# Patient Record
Sex: MALE | Race: WHITE | NOT HISPANIC OR LATINO | Employment: OTHER | URBAN - METROPOLITAN AREA
[De-identification: names, ages, dates, MRNs, and addresses within clinical notes are randomized per-mention and may not be internally consistent; named-entity substitution may affect disease eponyms.]

---

## 2023-04-21 LAB
ALANINE AMINOTRANSFERASE (SGPT) (U/L) IN SER/PLAS: 19 U/L (ref 10–52)
ALBUMIN (G/DL) IN SER/PLAS: 3.8 G/DL (ref 3.4–5)
ALKALINE PHOSPHATASE (U/L) IN SER/PLAS: 68 U/L (ref 33–120)
ANION GAP IN SER/PLAS: 9 MMOL/L (ref 10–20)
ASPARTATE AMINOTRANSFERASE (SGOT) (U/L) IN SER/PLAS: 20 U/L (ref 9–39)
BILIRUBIN TOTAL (MG/DL) IN SER/PLAS: 2.3 MG/DL (ref 0–1.2)
CALCIDIOL (25 OH VITAMIN D3) (NG/ML) IN SER/PLAS: 71 NG/ML
CALCIUM (MG/DL) IN SER/PLAS: 9.4 MG/DL (ref 8.6–10.3)
CARBON DIOXIDE, TOTAL (MMOL/L) IN SER/PLAS: 29 MMOL/L (ref 21–32)
CHLORIDE (MMOL/L) IN SER/PLAS: 105 MMOL/L (ref 98–107)
CHOLESTEROL (MG/DL) IN SER/PLAS: 161 MG/DL (ref 0–199)
CHOLESTEROL IN HDL (MG/DL) IN SER/PLAS: 52.7 MG/DL
CHOLESTEROL/HDL RATIO: 3.1
COBALAMIN (VITAMIN B12) (PG/ML) IN SER/PLAS: 450 PG/ML (ref 211–911)
CREATININE (MG/DL) IN SER/PLAS: 0.58 MG/DL (ref 0.5–1.3)
ERYTHROCYTE DISTRIBUTION WIDTH (RATIO) BY AUTOMATED COUNT: 14.6 % (ref 11.5–14.5)
ERYTHROCYTE MEAN CORPUSCULAR HEMOGLOBIN CONCENTRATION (G/DL) BY AUTOMATED: 33.3 G/DL (ref 32–36)
ERYTHROCYTE MEAN CORPUSCULAR VOLUME (FL) BY AUTOMATED COUNT: 99 FL (ref 80–100)
ERYTHROCYTES (10*6/UL) IN BLOOD BY AUTOMATED COUNT: 5.41 X10E12/L (ref 4.5–5.9)
ESTIMATED AVERAGE GLUCOSE FOR HBA1C: 117 MG/DL
GFR MALE: >90 ML/MIN/1.73M2
GLUCOSE (MG/DL) IN SER/PLAS: 89 MG/DL (ref 74–99)
HEMATOCRIT (%) IN BLOOD BY AUTOMATED COUNT: 53.5 % (ref 41–52)
HEMOGLOBIN (G/DL) IN BLOOD: 17.8 G/DL (ref 13.5–17.5)
HEMOGLOBIN A1C/HEMOGLOBIN TOTAL IN BLOOD: 5.7 %
LDL: 93 MG/DL (ref 0–99)
LEUKOCYTES (10*3/UL) IN BLOOD BY AUTOMATED COUNT: 3.3 X10E9/L (ref 4.4–11.3)
PLATELETS (10*3/UL) IN BLOOD AUTOMATED COUNT: 150 X10E9/L (ref 150–450)
POTASSIUM (MMOL/L) IN SER/PLAS: 4.1 MMOL/L (ref 3.5–5.3)
PROTEIN TOTAL: 6.8 G/DL (ref 6.4–8.2)
SODIUM (MMOL/L) IN SER/PLAS: 139 MMOL/L (ref 136–145)
THYROTROPIN (MIU/L) IN SER/PLAS BY DETECTION LIMIT <= 0.05 MIU/L: 0.04 MIU/L (ref 0.44–3.98)
THYROXINE (T4) FREE (NG/DL) IN SER/PLAS: 1.25 NG/DL (ref 0.61–1.12)
TRIGLYCERIDE (MG/DL) IN SER/PLAS: 77 MG/DL (ref 0–149)
UREA NITROGEN (MG/DL) IN SER/PLAS: 10 MG/DL (ref 6–23)
VLDL: 15 MG/DL (ref 0–40)

## 2023-04-24 ENCOUNTER — OFFICE VISIT (OUTPATIENT)
Dept: PRIMARY CARE | Facility: CLINIC | Age: 48
End: 2023-04-24
Payer: MEDICARE

## 2023-04-24 VITALS
BODY MASS INDEX: 28.31 KG/M2 | HEIGHT: 63 IN | RESPIRATION RATE: 15 BRPM | SYSTOLIC BLOOD PRESSURE: 90 MMHG | OXYGEN SATURATION: 97 % | WEIGHT: 159.8 LBS | DIASTOLIC BLOOD PRESSURE: 67 MMHG | TEMPERATURE: 97.2 F | HEART RATE: 56 BPM

## 2023-04-24 DIAGNOSIS — M05.9 RHEUMATOID ARTHRITIS WITH POSITIVE RHEUMATOID FACTOR, INVOLVING UNSPECIFIED SITE (MULTI): ICD-10-CM

## 2023-04-24 DIAGNOSIS — E03.9 HYPOTHYROIDISM, UNSPECIFIED TYPE: ICD-10-CM

## 2023-04-24 DIAGNOSIS — Q90.9 DOWN'S SYNDROME (HHS-HCC): ICD-10-CM

## 2023-04-24 DIAGNOSIS — Z00.00 MEDICARE ANNUAL WELLNESS VISIT, SUBSEQUENT: Primary | ICD-10-CM

## 2023-04-24 DIAGNOSIS — E53.8 VITAMIN B12 DEFICIENCY: ICD-10-CM

## 2023-04-24 DIAGNOSIS — E55.9 VITAMIN D DEFICIENCY: ICD-10-CM

## 2023-04-24 DIAGNOSIS — R63.4 WEIGHT LOSS, UNINTENTIONAL: ICD-10-CM

## 2023-04-24 DIAGNOSIS — I10 BENIGN ESSENTIAL HYPERTENSION: ICD-10-CM

## 2023-04-24 DIAGNOSIS — I27.20 PULMONARY HYPERTENSION (MULTI): ICD-10-CM

## 2023-04-24 DIAGNOSIS — R73.01 IMPAIRED FASTING GLUCOSE: ICD-10-CM

## 2023-04-24 DIAGNOSIS — E78.2 MIXED HYPERLIPIDEMIA: ICD-10-CM

## 2023-04-24 DIAGNOSIS — D69.6 THROMBOCYTOPENIA (CMS-HCC): ICD-10-CM

## 2023-04-24 DIAGNOSIS — M35.9 CONNECTIVE TISSUE DISEASE (MULTI): ICD-10-CM

## 2023-04-24 DIAGNOSIS — D72.819 LEUKOPENIA, UNSPECIFIED TYPE: ICD-10-CM

## 2023-04-24 PROBLEM — R19.00 PELVIC MASS IN MALE: Status: ACTIVE | Noted: 2023-04-24

## 2023-04-24 PROBLEM — R76.8 ANA POSITIVE: Status: ACTIVE | Noted: 2023-04-24

## 2023-04-24 PROBLEM — J30.2 SEASONAL ALLERGIES: Status: ACTIVE | Noted: 2023-04-24

## 2023-04-24 PROBLEM — M53.2X1 ATLANTO-AXIAL INSTABILITY: Status: ACTIVE | Noted: 2023-04-24

## 2023-04-24 PROBLEM — K44.9 DIAPHRAGMATIC HERNIA: Status: ACTIVE | Noted: 2023-04-24

## 2023-04-24 PROBLEM — G47.33 OBSTRUCTIVE SLEEP APNEA: Status: ACTIVE | Noted: 2023-04-24

## 2023-04-24 PROBLEM — M19.90 ARTHRITIS: Status: ACTIVE | Noted: 2023-04-24

## 2023-04-24 PROBLEM — Q21.3: Status: ACTIVE | Noted: 2023-04-24

## 2023-04-24 PROBLEM — M20.10 HALLUX VALGUS WITH BUNIONS: Status: ACTIVE | Noted: 2023-04-24

## 2023-04-24 PROBLEM — F41.8 SITUATIONAL ANXIETY: Status: ACTIVE | Noted: 2023-04-24

## 2023-04-24 PROBLEM — M21.619 HALLUX VALGUS WITH BUNIONS: Status: ACTIVE | Noted: 2023-04-24

## 2023-04-24 PROBLEM — R22.2 PARASPINAL MASS: Status: ACTIVE | Noted: 2023-04-24

## 2023-04-24 PROBLEM — L30.9 ECZEMA OF HAND: Status: ACTIVE | Noted: 2023-04-24

## 2023-04-24 PROBLEM — D17.9 MULTIPLE LIPOMAS: Status: ACTIVE | Noted: 2023-04-24

## 2023-04-24 PROBLEM — E78.5 HYPERLIPIDEMIA: Status: ACTIVE | Noted: 2023-04-24

## 2023-04-24 PROBLEM — K59.09 CHRONIC CONSTIPATION: Status: ACTIVE | Noted: 2023-04-24

## 2023-04-24 PROCEDURE — 3008F BODY MASS INDEX DOCD: CPT | Performed by: FAMILY MEDICINE

## 2023-04-24 PROCEDURE — 3074F SYST BP LT 130 MM HG: CPT | Performed by: FAMILY MEDICINE

## 2023-04-24 PROCEDURE — 1036F TOBACCO NON-USER: CPT | Performed by: FAMILY MEDICINE

## 2023-04-24 PROCEDURE — 99214 OFFICE O/P EST MOD 30 MIN: CPT | Performed by: FAMILY MEDICINE

## 2023-04-24 PROCEDURE — G0439 PPPS, SUBSEQ VISIT: HCPCS | Performed by: FAMILY MEDICINE

## 2023-04-24 PROCEDURE — 3078F DIAST BP <80 MM HG: CPT | Performed by: FAMILY MEDICINE

## 2023-04-24 RX ORDER — NAPROXEN 500 MG/1
1 TABLET ORAL DAILY
COMMUNITY
Start: 2021-04-05

## 2023-04-24 RX ORDER — LEVOTHYROXINE SODIUM 100 UG/1
TABLET ORAL
COMMUNITY
End: 2023-04-24 | Stop reason: DRUGHIGH

## 2023-04-24 RX ORDER — FUROSEMIDE 40 MG/1
40 TABLET ORAL DAILY
COMMUNITY
End: 2023-07-24 | Stop reason: SDUPTHER

## 2023-04-24 RX ORDER — IBUPROFEN 100 MG/5ML
1 SUSPENSION, ORAL (FINAL DOSE FORM) ORAL DAILY
COMMUNITY
Start: 2021-10-14

## 2023-04-24 RX ORDER — POTASSIUM CHLORIDE 750 MG/1
10 TABLET, EXTENDED RELEASE ORAL 2 TIMES DAILY
COMMUNITY
End: 2023-07-24 | Stop reason: SDUPTHER

## 2023-04-24 RX ORDER — POLYETHYLENE GLYCOL 3350 17 G/17G
POWDER, FOR SOLUTION ORAL
COMMUNITY
Start: 2022-08-30

## 2023-04-24 RX ORDER — CHOLECALCIFEROL (VITAMIN D3) 125 MCG
1 CAPSULE ORAL DAILY
COMMUNITY
Start: 2021-04-19 | End: 2024-05-09 | Stop reason: ALTCHOICE

## 2023-04-24 RX ORDER — ISOSORBIDE MONONITRATE 10 MG/1
10 TABLET ORAL DAILY
COMMUNITY
End: 2023-07-24 | Stop reason: SDUPTHER

## 2023-04-24 RX ORDER — LEVOTHYROXINE SODIUM 75 UG/1
75 TABLET ORAL DAILY
Qty: 30 TABLET | Refills: 5 | Status: SHIPPED | OUTPATIENT
Start: 2023-04-24 | End: 2023-07-24 | Stop reason: SDUPTHER

## 2023-04-24 RX ORDER — ATORVASTATIN CALCIUM 20 MG/1
20 TABLET, FILM COATED ORAL NIGHTLY
COMMUNITY
End: 2023-07-24 | Stop reason: SDUPTHER

## 2023-04-24 RX ORDER — HYDROXYCHLOROQUINE SULFATE 200 MG/1
1 TABLET, FILM COATED ORAL 2 TIMES DAILY
COMMUNITY
End: 2023-11-16 | Stop reason: SDUPTHER

## 2023-04-24 ASSESSMENT — ACTIVITIES OF DAILY LIVING (ADL)
MANAGING_FINANCES: TOTAL CARE
DRESSING: INDEPENDENT
BATHING: INDEPENDENT
GROCERY_SHOPPING: NEEDS ASSISTANCE
DOING_HOUSEWORK: INDEPENDENT
TAKING_MEDICATION: NEEDS ASSISTANCE

## 2023-04-24 ASSESSMENT — ENCOUNTER SYMPTOMS
ABDOMINAL PAIN: 0
UNEXPECTED WEIGHT CHANGE: 1
PALPITATIONS: 0
SHORTNESS OF BREATH: 0
DEPRESSION: 0
ARTHRALGIAS: 0
OCCASIONAL FEELINGS OF UNSTEADINESS: 0
LOSS OF SENSATION IN FEET: 0
CHILLS: 0
CONFUSION: 0
FEVER: 0
CHEST TIGHTNESS: 0

## 2023-04-24 ASSESSMENT — PATIENT HEALTH QUESTIONNAIRE - PHQ9
SUM OF ALL RESPONSES TO PHQ9 QUESTIONS 1 AND 2: 0
2. FEELING DOWN, DEPRESSED OR HOPELESS: NOT AT ALL
1. LITTLE INTEREST OR PLEASURE IN DOING THINGS: NOT AT ALL

## 2023-04-24 NOTE — ASSESSMENT & PLAN NOTE
Patient continues to lose weight.  Father points out that patient does not seem to be eating as much as he used to.  Things seem to start when he had to have dental work done and get dentures.  Patient denies abdominal pain or changes in bowel habits.  Refer to gastroenterology for further evaluation.  We discussed the potential for an underlying malignancy which lead to his death.  Dad verbalizes understanding.  Previous tests have been reviewed.

## 2023-04-24 NOTE — ASSESSMENT & PLAN NOTE
TSH decreased T4 slightly increased we discussed that thyroid being little over active/supplemented may be contributing to weight loss.  But not necessarily the whole explanation.  We will decrease levothyroxine to 75 mcg daily

## 2023-04-24 NOTE — ASSESSMENT & PLAN NOTE
Discussed with Dr Pool, patient will need to have a re-attempt at [facility] with anesthesia for IR venogram with RF wire.     --Orders placed for [facility]    Jeanne Sanchez PA-C  [phone]  Interventional Radiology   Staffed case with Dr. Pool   Stable continue to monitor

## 2023-04-24 NOTE — PROGRESS NOTES
"Subjective   Reason for Visit: Navin Johnston is an 47 y.o. male here for a Medicare Wellness visit.     Past Medical, Surgical, and Family History reviewed and updated in chart.    Reviewed all medications by prescribing practitioner or clinical pharmacist (such as prescriptions, OTCs, herbal therapies and supplements) and documented in the medical record.    HPI patient today with dad for follow-up of ongoing healthcare issues.  For the most part patient is doing okay except he continues to lose weight.  Dad points out that he is not eating the way he used to.  Eats less amounts of food.  Says he gets full a little earlier than usual.  Reviewing his case dad points out that a lot of this started around the time the patient was having extensive dental work done had to have teeth pulled ended up with dentures.  Since then they have had to prepare his food differently for him to be able to consume it.  Patient denies any abdominal pain or changes in bowel habits.    Patient Care Team:  Fredy Wilson MD as PCP - General  Fredy Wilson MD as PCP - [facility] Attributed Provider     Review of Systems   Constitutional:  Positive for unexpected weight change. Negative for chills and fever.   HENT:  Negative for congestion and ear pain.    Eyes:  Negative for visual disturbance.   Respiratory:  Negative for chest tightness and shortness of breath.    Cardiovascular:  Negative for chest pain and palpitations.   Gastrointestinal:  Negative for abdominal pain.   Musculoskeletal:  Negative for arthralgias.   Skin:  Negative for pallor.   Neurological:         Underlying Down syndrome   Psychiatric/Behavioral:  Negative for confusion.        Objective   Vitals:  BP 90/67   Pulse 56   Temp 36.2 °C (97.2 °F)   Resp 15   Ht 1.6 m (5' 3\")   Wt 72.5 kg (159 lb 12.8 oz)   SpO2 97%   BMI 28.31 kg/m²       Physical Exam  Vitals and nursing note reviewed.   Constitutional:       General: He is not in acute distress.     " Appearance: Normal appearance. He is not ill-appearing.      Comments: Unintentional weight loss   HENT:      Head: Normocephalic and atraumatic.      Right Ear: Tympanic membrane, ear canal and external ear normal.      Left Ear: Tympanic membrane, ear canal and external ear normal.      Mouth/Throat:      Pharynx: Oropharynx is clear.   Eyes:      Extraocular Movements: Extraocular movements intact.   Cardiovascular:      Rate and Rhythm: Normal rate and regular rhythm.      Pulses: Normal pulses.      Heart sounds: Normal heart sounds.   Pulmonary:      Effort: Pulmonary effort is normal.      Breath sounds: Normal breath sounds.   Abdominal:      General: Abdomen is flat. Bowel sounds are normal.      Palpations: Abdomen is soft.      Tenderness: There is no abdominal tenderness.   Musculoskeletal:         General: Normal range of motion.      Cervical back: Neck supple.   Skin:     General: Skin is warm.   Neurological:      Mental Status: He is alert and oriented to person, place, and time. Mental status is at baseline.      Comments: Underlying Down syndrome   Psychiatric:         Mood and Affect: Mood normal.       Recent Results (from the past 1008 hour(s))   TSH with reflex to Free T4 if abnormal    Collection Time: 04/21/23  9:30 AM   Result Value Ref Range    TSH 0.04 (L) 0.44 - 3.98 mIU/L   Vitamin B12    Collection Time: 04/21/23  9:30 AM   Result Value Ref Range    Vitamin B-12 450 211 - 911 pg/mL   Hemoglobin A1C    Collection Time: 04/21/23  9:30 AM   Result Value Ref Range    Hemoglobin A1C 5.7 (A) %    Estimated Average Glucose 117 MG/DL   Comprehensive Metabolic Panel    Collection Time: 04/21/23  9:30 AM   Result Value Ref Range    Glucose 89 74 - 99 mg/dL    Sodium 139 136 - 145 mmol/L    Potassium 4.1 3.5 - 5.3 mmol/L    Chloride 105 98 - 107 mmol/L    Bicarbonate 29 21 - 32 mmol/L    Anion Gap 9 (L) 10 - 20 mmol/L    Urea Nitrogen 10 6 - 23 mg/dL    Creatinine 0.58 0.50 - 1.30 mg/dL    GFR  MALE >90 >90 mL/min/1.73m2    Calcium 9.4 8.6 - 10.3 mg/dL    Albumin 3.8 3.4 - 5.0 g/dL    Alkaline Phosphatase 68 33 - 120 U/L    Total Protein 6.8 6.4 - 8.2 g/dL    AST 20 9 - 39 U/L    Total Bilirubin 2.3 (H) 0.0 - 1.2 mg/dL    ALT (SGPT) 19 10 - 52 U/L   Vitamin D, Total    Collection Time: 04/21/23  9:30 AM   Result Value Ref Range    Vitamin D, 25-Hydroxy 71 ng/mL   Lipid Panel    Collection Time: 04/21/23  9:30 AM   Result Value Ref Range    Cholesterol 161 0 - 199 mg/dL    HDL 52.7 mg/dL    Cholesterol/HDL Ratio 3.1     LDL 93 0 - 99 mg/dL    VLDL 15 0 - 40 mg/dL    Triglycerides 77 0 - 149 mg/dL   CBC    Collection Time: 04/21/23  9:30 AM   Result Value Ref Range    WBC 3.3 (L) 4.4 - 11.3 x10E9/L    RBC 5.41 4.50 - 5.90 x10E12/L    Hemoglobin 17.8 (H) 13.5 - 17.5 g/dL    Hematocrit 53.5 (H) 41.0 - 52.0 %    MCV 99 80 - 100 fL    MCHC 33.3 32.0 - 36.0 g/dL    Platelets 150 150 - 450 x10E9/L    RDW 14.6 (H) 11.5 - 14.5 %   Thyroxine, Free    Collection Time: 04/21/23  9:30 AM   Result Value Ref Range    Free T4 1.25 (H) 0.61 - 1.12 ng/dL     Patient referred to gastroenterology for formal GI evaluation given his unintentional weight loss.  Thyroid medication adjusted  Discussed with the father the potential for underlying malignancy he verbalizes understanding.  We discussed hematology oncology referral at this point dad's elected to hold off on that we will await GI input first.  Return to office in 12 weeks with fasting lab work and reassessment of his case call if any questions or concerns in the meantime.    Assessment/Plan   Problem List Items Addressed This Visit       Benign essential hypertension    Current Assessment & Plan     Stable continue current medication         Relevant Orders    Follow Up In Primary Care    Down's syndrome    Current Assessment & Plan     Clinically stable         Hyperlipidemia    Current Assessment & Plan     Nice improvement in lipids continue to monitor and continue  Lipitor 20 mg daily         Relevant Orders    Comprehensive Metabolic Panel    Lipid Panel    Follow Up In Primary Care    Hypothyroidism    Current Assessment & Plan     TSH decreased T4 slightly increased we discussed that thyroid being little over active/supplemented may be contributing to weight loss.  But not necessarily the whole explanation.  We will decrease levothyroxine to 75 mcg daily         Relevant Medications    levothyroxine (Synthroid, Levoxyl) 75 mcg tablet    Other Relevant Orders    TSH with reflex to Free T4 if abnormal    Follow Up In Primary Care    Connective tissue disease (CMS/[facility])    Current Assessment & Plan     Continue to follow with rheumatology         Leukopenia    Current Assessment & Plan     Mild and stable continue to monitor we discussed heme-onc referral dad wants to hold off         Relevant Orders    CBC    Follow Up In Primary Care    Impaired fasting glucose    Current Assessment & Plan     A1c improved down to 5.7% continue dietary modifications         Relevant Orders    Comprehensive Metabolic Panel    Hemoglobin A1C    Rheumatoid arthritis with rheumatoid factor (CMS/[facility])    Current Assessment & Plan     Stable continue to follow with rheumatology         Pulmonary hypertension (CMS/[facility])    Current Assessment & Plan     Clinically stable continue current treatment follow-up with cardiology         Thrombocytopenia (CMS/[facility])    Current Assessment & Plan     Improved continue to monitor         Relevant Orders    CBC    Vitamin B12 deficiency    Current Assessment & Plan     Stable continue to monitor         Relevant Orders    Vitamin B12    Vitamin D deficiency    Current Assessment & Plan     Stable continue to monitor         Relevant Orders    Vitamin D 1,25 Dihydroxy    Medicare annual wellness visit, subsequent - Primary    Current Assessment & Plan     Labs reviewed.   GI referral placed.         Weight loss, unintentional    Current Assessment & Plan     Patient  continues to lose weight.  Father points out that patient does not seem to be eating as much as he used to.  Things seem to start when he had to have dental work done and get dentures.  Patient denies abdominal pain or changes in bowel habits.  Refer to gastroenterology for further evaluation.  We discussed the potential for an underlying malignancy which lead to his death.  Dad verbalizes understanding.  Previous tests have been reviewed.         Relevant Orders    Referral to Gastroenterology    Follow Up In Primary Care

## 2023-05-16 DIAGNOSIS — E03.9 HYPOTHYROIDISM, UNSPECIFIED TYPE: ICD-10-CM

## 2023-05-16 RX ORDER — LEVOTHYROXINE SODIUM 75 UG/1
TABLET ORAL
Qty: 30 TABLET | Refills: 5 | OUTPATIENT
Start: 2023-05-16

## 2023-07-18 ENCOUNTER — LAB (OUTPATIENT)
Dept: LAB | Facility: LAB | Age: 48
End: 2023-07-18
Payer: MEDICARE

## 2023-07-18 DIAGNOSIS — D69.6 THROMBOCYTOPENIA (CMS-HCC): ICD-10-CM

## 2023-07-18 DIAGNOSIS — E53.8 VITAMIN B12 DEFICIENCY: ICD-10-CM

## 2023-07-18 DIAGNOSIS — E55.9 VITAMIN D DEFICIENCY: ICD-10-CM

## 2023-07-18 DIAGNOSIS — D72.819 LEUKOPENIA, UNSPECIFIED TYPE: ICD-10-CM

## 2023-07-18 DIAGNOSIS — E03.9 HYPOTHYROIDISM, UNSPECIFIED TYPE: ICD-10-CM

## 2023-07-18 DIAGNOSIS — E78.2 MIXED HYPERLIPIDEMIA: ICD-10-CM

## 2023-07-18 DIAGNOSIS — R73.01 IMPAIRED FASTING GLUCOSE: ICD-10-CM

## 2023-07-18 LAB
ALANINE AMINOTRANSFERASE (SGPT) (U/L) IN SER/PLAS: 18 U/L (ref 10–52)
ALBUMIN (G/DL) IN SER/PLAS: 3.6 G/DL (ref 3.4–5)
ALKALINE PHOSPHATASE (U/L) IN SER/PLAS: 77 U/L (ref 33–120)
ANION GAP IN SER/PLAS: 10 MMOL/L (ref 10–20)
ASPARTATE AMINOTRANSFERASE (SGOT) (U/L) IN SER/PLAS: 18 U/L (ref 9–39)
BILIRUBIN TOTAL (MG/DL) IN SER/PLAS: 1.4 MG/DL (ref 0–1.2)
CALCIUM (MG/DL) IN SER/PLAS: 8.8 MG/DL (ref 8.6–10.3)
CARBON DIOXIDE, TOTAL (MMOL/L) IN SER/PLAS: 30 MMOL/L (ref 21–32)
CHLORIDE (MMOL/L) IN SER/PLAS: 106 MMOL/L (ref 98–107)
CHOLESTEROL (MG/DL) IN SER/PLAS: 163 MG/DL (ref 0–199)
CHOLESTEROL IN HDL (MG/DL) IN SER/PLAS: 45.7 MG/DL
CHOLESTEROL/HDL RATIO: 3.6
COBALAMIN (VITAMIN B12) (PG/ML) IN SER/PLAS: 364 PG/ML (ref 211–911)
CREATININE (MG/DL) IN SER/PLAS: 0.58 MG/DL (ref 0.5–1.3)
ERYTHROCYTE DISTRIBUTION WIDTH (RATIO) BY AUTOMATED COUNT: 13.8 % (ref 11.5–14.5)
ERYTHROCYTE MEAN CORPUSCULAR HEMOGLOBIN CONCENTRATION (G/DL) BY AUTOMATED: 33.5 G/DL (ref 32–36)
ERYTHROCYTE MEAN CORPUSCULAR VOLUME (FL) BY AUTOMATED COUNT: 101 FL (ref 80–100)
ERYTHROCYTES (10*6/UL) IN BLOOD BY AUTOMATED COUNT: 4.83 X10E12/L (ref 4.5–5.9)
ESTIMATED AVERAGE GLUCOSE FOR HBA1C: 111 MG/DL
GFR MALE: >90 ML/MIN/1.73M2
GLUCOSE (MG/DL) IN SER/PLAS: 88 MG/DL (ref 74–99)
HEMATOCRIT (%) IN BLOOD BY AUTOMATED COUNT: 49 % (ref 41–52)
HEMOGLOBIN (G/DL) IN BLOOD: 16.4 G/DL (ref 13.5–17.5)
HEMOGLOBIN A1C/HEMOGLOBIN TOTAL IN BLOOD: 5.5 %
LDL: 105 MG/DL (ref 0–99)
LEUKOCYTES (10*3/UL) IN BLOOD BY AUTOMATED COUNT: 2.9 X10E9/L (ref 4.4–11.3)
PLATELETS (10*3/UL) IN BLOOD AUTOMATED COUNT: 168 X10E9/L (ref 150–450)
POTASSIUM (MMOL/L) IN SER/PLAS: 4 MMOL/L (ref 3.5–5.3)
PROTEIN TOTAL: 6.4 G/DL (ref 6.4–8.2)
SODIUM (MMOL/L) IN SER/PLAS: 142 MMOL/L (ref 136–145)
THYROTROPIN (MIU/L) IN SER/PLAS BY DETECTION LIMIT <= 0.05 MIU/L: 1.27 MIU/L (ref 0.44–3.98)
TRIGLYCERIDE (MG/DL) IN SER/PLAS: 62 MG/DL (ref 0–149)
UREA NITROGEN (MG/DL) IN SER/PLAS: 12 MG/DL (ref 6–23)
VLDL: 12 MG/DL (ref 0–40)

## 2023-07-18 PROCEDURE — 83036 HEMOGLOBIN GLYCOSYLATED A1C: CPT

## 2023-07-18 PROCEDURE — 82607 VITAMIN B-12: CPT

## 2023-07-18 PROCEDURE — 36415 COLL VENOUS BLD VENIPUNCTURE: CPT

## 2023-07-18 PROCEDURE — 82652 VIT D 1 25-DIHYDROXY: CPT

## 2023-07-18 PROCEDURE — 80053 COMPREHEN METABOLIC PANEL: CPT

## 2023-07-18 PROCEDURE — 84443 ASSAY THYROID STIM HORMONE: CPT

## 2023-07-18 PROCEDURE — 80061 LIPID PANEL: CPT

## 2023-07-18 PROCEDURE — 85027 COMPLETE CBC AUTOMATED: CPT

## 2023-07-20 LAB — VITAMIN D 1,25-DIHYDROXY: 40.4 PG/ML (ref 19.9–79.3)

## 2023-07-24 ENCOUNTER — OFFICE VISIT (OUTPATIENT)
Dept: PRIMARY CARE | Facility: CLINIC | Age: 48
End: 2023-07-24
Payer: MEDICARE

## 2023-07-24 VITALS
BODY MASS INDEX: 27.1 KG/M2 | TEMPERATURE: 96.9 F | WEIGHT: 153 LBS | SYSTOLIC BLOOD PRESSURE: 97 MMHG | HEART RATE: 55 BPM | DIASTOLIC BLOOD PRESSURE: 60 MMHG | OXYGEN SATURATION: 98 % | RESPIRATION RATE: 14 BRPM

## 2023-07-24 DIAGNOSIS — E53.8 VITAMIN B12 DEFICIENCY: ICD-10-CM

## 2023-07-24 DIAGNOSIS — E55.9 VITAMIN D DEFICIENCY: ICD-10-CM

## 2023-07-24 DIAGNOSIS — D72.819 LEUKOPENIA, UNSPECIFIED TYPE: ICD-10-CM

## 2023-07-24 DIAGNOSIS — I10 BENIGN ESSENTIAL HYPERTENSION: ICD-10-CM

## 2023-07-24 DIAGNOSIS — E78.2 MIXED HYPERLIPIDEMIA: ICD-10-CM

## 2023-07-24 DIAGNOSIS — R63.4 WEIGHT LOSS, UNINTENTIONAL: ICD-10-CM

## 2023-07-24 DIAGNOSIS — E03.9 HYPOTHYROIDISM, UNSPECIFIED TYPE: ICD-10-CM

## 2023-07-24 DIAGNOSIS — R73.01 IMPAIRED FASTING GLUCOSE: ICD-10-CM

## 2023-07-24 DIAGNOSIS — D69.6 THROMBOCYTOPENIA (CMS-HCC): Primary | ICD-10-CM

## 2023-07-24 DIAGNOSIS — M05.9 RHEUMATOID ARTHRITIS WITH POSITIVE RHEUMATOID FACTOR, INVOLVING UNSPECIFIED SITE (MULTI): ICD-10-CM

## 2023-07-24 PROCEDURE — 1036F TOBACCO NON-USER: CPT | Performed by: FAMILY MEDICINE

## 2023-07-24 PROCEDURE — 3078F DIAST BP <80 MM HG: CPT | Performed by: FAMILY MEDICINE

## 2023-07-24 PROCEDURE — 99214 OFFICE O/P EST MOD 30 MIN: CPT | Performed by: FAMILY MEDICINE

## 2023-07-24 PROCEDURE — 3008F BODY MASS INDEX DOCD: CPT | Performed by: FAMILY MEDICINE

## 2023-07-24 PROCEDURE — 3074F SYST BP LT 130 MM HG: CPT | Performed by: FAMILY MEDICINE

## 2023-07-24 RX ORDER — POTASSIUM CHLORIDE 750 MG/1
10 TABLET, FILM COATED, EXTENDED RELEASE ORAL 2 TIMES DAILY
Qty: 180 TABLET | Refills: 3 | Status: SHIPPED | OUTPATIENT
Start: 2023-07-24 | End: 2024-05-09 | Stop reason: ALTCHOICE

## 2023-07-24 RX ORDER — FUROSEMIDE 40 MG/1
40 TABLET ORAL DAILY
Qty: 90 TABLET | Refills: 3 | Status: SHIPPED | OUTPATIENT
Start: 2023-07-24 | End: 2024-05-09 | Stop reason: SDUPTHER

## 2023-07-24 RX ORDER — ISOSORBIDE MONONITRATE 10 MG/1
10 TABLET ORAL DAILY
Qty: 90 TABLET | Refills: 3 | Status: SHIPPED | OUTPATIENT
Start: 2023-07-24 | End: 2024-05-09 | Stop reason: SDUPTHER

## 2023-07-24 RX ORDER — LEVOTHYROXINE SODIUM 75 UG/1
75 TABLET ORAL DAILY
Qty: 90 TABLET | Refills: 3 | Status: SHIPPED | OUTPATIENT
Start: 2023-07-24 | End: 2023-10-03 | Stop reason: SDUPTHER

## 2023-07-24 RX ORDER — ATORVASTATIN CALCIUM 20 MG/1
20 TABLET, FILM COATED ORAL NIGHTLY
Qty: 90 TABLET | Refills: 3 | Status: SHIPPED | OUTPATIENT
Start: 2023-07-24 | End: 2024-07-23

## 2023-07-24 ASSESSMENT — ENCOUNTER SYMPTOMS
CHEST TIGHTNESS: 0
ABDOMINAL PAIN: 0
UNEXPECTED WEIGHT CHANGE: 1
CONFUSION: 0
ARTHRALGIAS: 0
FEVER: 0
CHILLS: 0
SHORTNESS OF BREATH: 0
PALPITATIONS: 0

## 2023-07-24 NOTE — ASSESSMENT & PLAN NOTE
Patient has lost 6 pounds since the spring.  Downward trend continues.  Referral to new gastroenterologist for GI evaluation per family's request.  They were not comfortable with last GI specialist they had seen.  Also referred to hematology for second opinion with regards to unexplained weight loss and leukopenia.  Maintain healthy diet.  Patient/family report appetite is good they have not noticed any decrease in his food and liquid consumption.

## 2023-07-24 NOTE — ASSESSMENT & PLAN NOTE
Referral to gastroenterology for second opinion.  They were not satisfied with the previous GI specialist they had seen.  Also refer to hematology for second opinion with regards to unintentional weight loss and leukopenia.  Maintain healthy diet family reports that appetite for patient appears to be normal have not noticed any decline in food or liquid consumption.

## 2023-07-24 NOTE — PROGRESS NOTES
Subjective   Patient ID: Navin Johnston is a 48 y.o. male who presents for Follow-up (3 month).    HPI today with parents for follow-up of ongoing healthcare issues.  They says they met with Dr. Montana from gastroenterology for GI evaluation with regards to unintentional weight loss.  However they were not comfortable with his approach and would like to get a second opinion from another GI specialist.  Patient has undergone left foot surgery for bunion currently is recovering well and is in a boot and ambulating with the assistance of a cane.  They need paperwork completed for him to attend adult .  They state his appetite appears to be good and they have not noticed any significant decline in food or liquid consumption.    Review of Systems   Constitutional:  Positive for unexpected weight change. Negative for chills and fever.   HENT:  Negative for congestion and ear pain.    Eyes:  Negative for visual disturbance.   Respiratory:  Negative for chest tightness and shortness of breath.    Cardiovascular:  Negative for chest pain and palpitations.   Gastrointestinal:  Negative for abdominal pain.   Musculoskeletal:  Negative for arthralgias.   Skin:  Negative for pallor.   Psychiatric/Behavioral:  Negative for confusion.        Objective   BP 97/60   Pulse 55   Temp 36.1 °C (96.9 °F)   Resp 14   Wt 69.4 kg (153 lb)   SpO2 98%   BMI 27.10 kg/m²     Physical Exam  Vitals and nursing note reviewed.   Constitutional:       General: He is not in acute distress.     Appearance: Normal appearance. He is not ill-appearing.   HENT:      Head: Normocephalic and atraumatic.      Right Ear: Tympanic membrane, ear canal and external ear normal.      Left Ear: Tympanic membrane, ear canal and external ear normal.      Mouth/Throat:      Pharynx: Oropharynx is clear.   Eyes:      Extraocular Movements: Extraocular movements intact.   Cardiovascular:      Rate and Rhythm: Normal rate and regular rhythm.      Pulses:  Normal pulses.      Heart sounds: Normal heart sounds.   Pulmonary:      Effort: Pulmonary effort is normal.      Breath sounds: Normal breath sounds.   Abdominal:      General: Abdomen is flat. Bowel sounds are normal.      Palpations: Abdomen is soft.      Tenderness: There is no abdominal tenderness.   Musculoskeletal:         General: Normal range of motion.      Cervical back: Neck supple.      Comments: Orthopedic boot left lower extremity.  Ambulating with assistance of cane status post left foot surgery   Skin:     General: Skin is warm.   Neurological:      Mental Status: He is alert and oriented to person, place, and time. Mental status is at baseline.      Comments: Underlying Down syndrome   Psychiatric:         Mood and Affect: Mood normal.       Recent Results (from the past 1008 hour(s))   CBC    Collection Time: 07/18/23  9:30 AM   Result Value Ref Range    WBC 2.9 (L) 4.4 - 11.3 x10E9/L    RBC 4.83 4.50 - 5.90 x10E12/L    Hemoglobin 16.4 13.5 - 17.5 g/dL    Hematocrit 49.0 41.0 - 52.0 %     (H) 80 - 100 fL    MCHC 33.5 32.0 - 36.0 g/dL    Platelets 168 150 - 450 x10E9/L    RDW 13.8 11.5 - 14.5 %   Comprehensive Metabolic Panel    Collection Time: 07/18/23  9:30 AM   Result Value Ref Range    Glucose 88 74 - 99 mg/dL    Sodium 142 136 - 145 mmol/L    Potassium 4.0 3.5 - 5.3 mmol/L    Chloride 106 98 - 107 mmol/L    Bicarbonate 30 21 - 32 mmol/L    Anion Gap 10 10 - 20 mmol/L    Urea Nitrogen 12 6 - 23 mg/dL    Creatinine 0.58 0.50 - 1.30 mg/dL    GFR MALE >90 >90 mL/min/1.73m2    Calcium 8.8 8.6 - 10.3 mg/dL    Albumin 3.6 3.4 - 5.0 g/dL    Alkaline Phosphatase 77 33 - 120 U/L    Total Protein 6.4 6.4 - 8.2 g/dL    AST 18 9 - 39 U/L    Total Bilirubin 1.4 (H) 0.0 - 1.2 mg/dL    ALT (SGPT) 18 10 - 52 U/L   Hemoglobin A1C    Collection Time: 07/18/23  9:30 AM   Result Value Ref Range    Hemoglobin A1C 5.5 %    Estimated Average Glucose 111 MG/DL   Lipid Panel    Collection Time: 07/18/23  9:30  AM   Result Value Ref Range    Cholesterol 163 0 - 199 mg/dL    HDL 45.7 mg/dL    Cholesterol/HDL Ratio 3.6      (H) 0 - 99 mg/dL    VLDL 12 0 - 40 mg/dL    Triglycerides 62 0 - 149 mg/dL   TSH with reflex to Free T4 if abnormal    Collection Time: 07/18/23  9:30 AM   Result Value Ref Range    TSH 1.27 0.44 - 3.98 mIU/L   Vitamin D 1,25 Dihydroxy    Collection Time: 07/18/23  9:30 AM   Result Value Ref Range    Vit D, 1,25-Dihydroxy 40.4 19.9 - 79.3 pg/mL   Vitamin B12    Collection Time: 07/18/23  9:30 AM   Result Value Ref Range    Vitamin B-12 364 211 - 911 pg/mL     Recent labs reviewed with patient and family  Taylor to gastroenterology and hematology for second opinions with regards to leukopenia and unintentional weight loss    Continue regular medications    Stressed the importance of keeping appointments with specialist if there is any issues they are to call our office    Return to our office in 8 weeks to reassess his case and review specialty input    Paperwork for adult  completed    Assessment/Plan   Problem List Items Addressed This Visit       Benign essential hypertension     Stable continue current treatment and monitor         Relevant Medications    furosemide (Lasix) 40 mg tablet    isosorbide mononitrate 10 mg tablet    potassium chloride CR 10 mEq ER tablet    Other Relevant Orders    Follow Up In Primary Care - Established    Hyperlipidemia     Stable continue Lipitor 20 mg daily         Relevant Medications    atorvastatin (Lipitor) 20 mg tablet    Hypothyroidism     TSH improved continue levothyroxine 75 mcg daily refill provided         Relevant Medications    levothyroxine (Synthroid, Levoxyl) 75 mcg tablet    Leukopenia     White blood cell count trending downward referral to hematology for second opinion         Relevant Orders    Referral to Hematology    Follow Up In Primary Care - Established    Impaired fasting glucose     A1c down to 5.5% continue dietary  modifications         Rheumatoid arthritis with rheumatoid factor (CMS/HCC)     Continue current medications and follow with rheumatology         Thrombocytopenia (CMS/HCC) - Primary     Stable continue to monitor         Relevant Orders    Referral to Hematology    Vitamin B12 deficiency     Continue to monitor         Vitamin D deficiency     Continue to monitor and supplement as needed         Weight loss, unintentional     Referral to gastroenterology for second opinion.  They were not satisfied with the previous GI specialist they had seen.  Also refer to hematology for second opinion with regards to unintentional weight loss and leukopenia.  Maintain healthy diet family reports that appetite for patient appears to be normal have not noticed any decline in food or liquid consumption.         Relevant Orders    Referral to Gastroenterology    Referral to Hematology    Follow Up In Primary Care - Established

## 2023-08-02 ENCOUNTER — TELEPHONE (OUTPATIENT)
Dept: PRIMARY CARE | Facility: CLINIC | Age: 48
End: 2023-08-02
Payer: MEDICARE

## 2023-08-24 ENCOUNTER — TELEPHONE (OUTPATIENT)
Dept: PRIMARY CARE | Facility: CLINIC | Age: 48
End: 2023-08-24
Payer: MEDICARE

## 2023-08-24 NOTE — TELEPHONE ENCOUNTER
Patients patents calling to see why Navin is on Levothyroxine. Can the dose be lowered with another TSH. He is losing a considerable amount of weight since the initiation of this medication, over the past three years.    Please advise.  Also, have you seen the  results of the CT Scan done on August 3?

## 2023-09-19 ENCOUNTER — APPOINTMENT (OUTPATIENT)
Dept: PRIMARY CARE | Facility: CLINIC | Age: 48
End: 2023-09-19
Payer: MEDICARE

## 2023-10-02 ENCOUNTER — APPOINTMENT (OUTPATIENT)
Dept: RADIOLOGY | Facility: HOSPITAL | Age: 48
End: 2023-10-02
Payer: MEDICARE

## 2023-10-02 ENCOUNTER — HOSPITAL ENCOUNTER (EMERGENCY)
Facility: HOSPITAL | Age: 48
Discharge: HOME | End: 2023-10-02
Attending: STUDENT IN AN ORGANIZED HEALTH CARE EDUCATION/TRAINING PROGRAM | Admitting: EMERGENCY MEDICINE
Payer: MEDICARE

## 2023-10-02 VITALS
TEMPERATURE: 98.6 F | BODY MASS INDEX: 23.56 KG/M2 | OXYGEN SATURATION: 100 % | RESPIRATION RATE: 18 BRPM | SYSTOLIC BLOOD PRESSURE: 122 MMHG | WEIGHT: 150.13 LBS | DIASTOLIC BLOOD PRESSURE: 83 MMHG | HEIGHT: 67 IN | HEART RATE: 70 BPM

## 2023-10-02 DIAGNOSIS — R07.9 ACUTE CHEST PAIN: Primary | ICD-10-CM

## 2023-10-02 LAB
ALBUMIN SERPL BCP-MCNC: 3.5 G/DL (ref 3.4–5)
ALP SERPL-CCNC: 91 U/L (ref 33–120)
ALT SERPL W P-5'-P-CCNC: 20 U/L (ref 10–52)
ANION GAP SERPL CALC-SCNC: 9 MMOL/L (ref 10–20)
AST SERPL W P-5'-P-CCNC: 28 U/L (ref 9–39)
BASOPHILS # BLD AUTO: 0.04 X10*3/UL (ref 0–0.1)
BASOPHILS NFR BLD AUTO: 1.2 %
BILIRUB SERPL-MCNC: 1.3 MG/DL (ref 0–1.2)
BUN SERPL-MCNC: 9 MG/DL (ref 6–23)
CALCIUM SERPL-MCNC: 8.7 MG/DL (ref 8.6–10.3)
CARDIAC TROPONIN I PNL SERPL HS: 5 NG/L (ref 0–20)
CARDIAC TROPONIN I PNL SERPL HS: 6 NG/L (ref 0–20)
CHLORIDE SERPL-SCNC: 104 MMOL/L (ref 98–107)
CO2 SERPL-SCNC: 29 MMOL/L (ref 21–32)
CREAT SERPL-MCNC: 0.65 MG/DL (ref 0.5–1.3)
EOSINOPHIL # BLD AUTO: 0.04 X10*3/UL (ref 0–0.7)
EOSINOPHIL NFR BLD AUTO: 1.2 %
ERYTHROCYTE [DISTWIDTH] IN BLOOD BY AUTOMATED COUNT: 13.8 % (ref 11.5–14.5)
GFR SERPL CREATININE-BSD FRML MDRD: >90 ML/MIN/1.73M*2
GLUCOSE SERPL-MCNC: 104 MG/DL (ref 74–99)
HCT VFR BLD AUTO: 44.4 % (ref 41–52)
HGB BLD-MCNC: 15.4 G/DL (ref 13.5–17.5)
IMM GRANULOCYTES # BLD AUTO: 0.02 X10*3/UL (ref 0–0.7)
IMM GRANULOCYTES NFR BLD AUTO: 0.6 % (ref 0–0.9)
LYMPHOCYTES # BLD AUTO: 1 X10*3/UL (ref 1.2–4.8)
LYMPHOCYTES NFR BLD AUTO: 29.9 %
MAGNESIUM SERPL-MCNC: 1.88 MG/DL (ref 1.6–2.4)
MCH RBC QN AUTO: 34.6 PG (ref 26–34)
MCHC RBC AUTO-ENTMCNC: 34.7 G/DL (ref 32–36)
MCV RBC AUTO: 100 FL (ref 80–100)
MONOCYTES # BLD AUTO: 0.42 X10*3/UL (ref 0.1–1)
MONOCYTES NFR BLD AUTO: 12.5 %
NEUTROPHILS # BLD AUTO: 1.83 X10*3/UL (ref 1.2–7.7)
NEUTROPHILS NFR BLD AUTO: 54.6 %
NRBC BLD-RTO: 0 /100 WBCS (ref 0–0)
PLATELET # BLD AUTO: 135 X10*3/UL (ref 150–450)
PMV BLD AUTO: 10.8 FL (ref 7.5–11.5)
POTASSIUM SERPL-SCNC: 4.2 MMOL/L (ref 3.5–5.3)
PROT SERPL-MCNC: 6.1 G/DL (ref 6.4–8.2)
RBC # BLD AUTO: 4.45 X10*6/UL (ref 4.5–5.9)
SODIUM SERPL-SCNC: 138 MMOL/L (ref 136–145)
WBC # BLD AUTO: 3.4 X10*3/UL (ref 4.4–11.3)

## 2023-10-02 PROCEDURE — 36415 COLL VENOUS BLD VENIPUNCTURE: CPT | Performed by: STUDENT IN AN ORGANIZED HEALTH CARE EDUCATION/TRAINING PROGRAM

## 2023-10-02 PROCEDURE — 84484 ASSAY OF TROPONIN QUANT: CPT | Performed by: STUDENT IN AN ORGANIZED HEALTH CARE EDUCATION/TRAINING PROGRAM

## 2023-10-02 PROCEDURE — 80053 COMPREHEN METABOLIC PANEL: CPT | Performed by: STUDENT IN AN ORGANIZED HEALTH CARE EDUCATION/TRAINING PROGRAM

## 2023-10-02 PROCEDURE — 83735 ASSAY OF MAGNESIUM: CPT | Performed by: STUDENT IN AN ORGANIZED HEALTH CARE EDUCATION/TRAINING PROGRAM

## 2023-10-02 PROCEDURE — 71046 X-RAY EXAM CHEST 2 VIEWS: CPT | Mod: FOREIGN READ | Performed by: RADIOLOGY

## 2023-10-02 PROCEDURE — 2500000001 HC RX 250 WO HCPCS SELF ADMINISTERED DRUGS (ALT 637 FOR MEDICARE OP): Performed by: STUDENT IN AN ORGANIZED HEALTH CARE EDUCATION/TRAINING PROGRAM

## 2023-10-02 PROCEDURE — 99284 EMERGENCY DEPT VISIT MOD MDM: CPT | Performed by: STUDENT IN AN ORGANIZED HEALTH CARE EDUCATION/TRAINING PROGRAM

## 2023-10-02 PROCEDURE — 85025 COMPLETE CBC W/AUTO DIFF WBC: CPT | Performed by: STUDENT IN AN ORGANIZED HEALTH CARE EDUCATION/TRAINING PROGRAM

## 2023-10-02 PROCEDURE — 71046 X-RAY EXAM CHEST 2 VIEWS: CPT | Mod: FY,FR

## 2023-10-02 RX ORDER — NAPROXEN SODIUM 220 MG/1
324 TABLET, FILM COATED ORAL ONCE
Status: COMPLETED | OUTPATIENT
Start: 2023-10-02 | End: 2023-10-02

## 2023-10-02 RX ADMIN — ASPIRIN 81 MG CHEWABLE TABLET 324 MG: 81 TABLET CHEWABLE at 12:09

## 2023-10-02 ASSESSMENT — PAIN SCALES - GENERAL
PAINLEVEL_OUTOF10: 0 - NO PAIN
PAINLEVEL_OUTOF10: 0 - NO PAIN

## 2023-10-02 ASSESSMENT — PAIN - FUNCTIONAL ASSESSMENT: PAIN_FUNCTIONAL_ASSESSMENT: 0-10

## 2023-10-02 ASSESSMENT — LIFESTYLE VARIABLES
HAVE PEOPLE ANNOYED YOU BY CRITICIZING YOUR DRINKING: NO
HAVE YOU EVER FELT YOU SHOULD CUT DOWN ON YOUR DRINKING: NO
EVER FELT BAD OR GUILTY ABOUT YOUR DRINKING: NO
EVER HAD A DRINK FIRST THING IN THE MORNING TO STEADY YOUR NERVES TO GET RID OF A HANGOVER: NO

## 2023-10-02 ASSESSMENT — COLUMBIA-SUICIDE SEVERITY RATING SCALE - C-SSRS
2. HAVE YOU ACTUALLY HAD ANY THOUGHTS OF KILLING YOURSELF?: NO
6. HAVE YOU EVER DONE ANYTHING, STARTED TO DO ANYTHING, OR PREPARED TO DO ANYTHING TO END YOUR LIFE?: NO
1. IN THE PAST MONTH, HAVE YOU WISHED YOU WERE DEAD OR WISHED YOU COULD GO TO SLEEP AND NOT WAKE UP?: NO

## 2023-10-02 NOTE — ED PROVIDER NOTES
HPI   Chief Complaint   Patient presents with   • Chest Pain     No complaints of CP on arrival to ED       48-year-old male, history of Down syndrome with prior tetralogy of Fallot with VSD, resultant pulmonary hypertension, connective tissue disease, diaphragmatic hernia, hypertension, hyperlipidemia, hypothyroidism, and MANNY, presenting to the emergency department today for chest pain.  Chest pain occurred while at work.  Center of his chest, nonradiating, was associated with diaphoresis according to the patient's manager who is with him.  Symptoms spontaneously resolved prior to EMS arrival.  Additional history surrounding the patient's chest pain is unable to be obtained given the patient's baseline mental status. he was brought in for further evaluation.  He is currently pain-free.  Dad and the manager who is with the patient report he has had chest pain like this in the past.        History provided by:  Caregiver, parent and EMS personnel  History limited by: Baseline Down syndrome.   used: No                        No data recorded                Patient History   Past Medical History:   Diagnosis Date   • Obstructive sleep apnea (adult) (pediatric)     MANNY on CPAP   • Other conditions influencing health status 03/16/2018    Pulmonary hypertension, secondary   • Personal history of other diseases of the digestive system     History of fatty infiltration of liver   • Personal history of other diseases of the musculoskeletal system and connective tissue     History of rheumatoid arthritis   • Personal history of other endocrine, nutritional and metabolic disease     History of hypothyroidism   • Personal history of other endocrine, nutritional and metabolic disease     History of obesity   • Syncope and collapse     Syncope and collapse   • Unspecified hearing loss, bilateral     Hearing loss, bilateral     Past Surgical History:   Procedure Laterality Date   • CHOLECYSTECTOMY  03/13/2018     Cholecystectomy   • OTHER SURGICAL HISTORY  03/13/2018    Ventricular Septal Defect Repair     Family History   Problem Relation Name Age of Onset   • Heart disease Mother     • Uterine cancer Mother     • Diabetes type II Mother     • Heart disease Father     • Diabetes type II Father     • Diabetes type II Brother       Social History     Tobacco Use   • Smoking status: Never     Passive exposure: Never   • Smokeless tobacco: Never   Vaping Use   • Vaping Use: Never used   Substance Use Topics   • Alcohol use: Never   • Drug use: Never       Physical Exam   ED Triage Vitals [10/02/23 1058]   Temp Heart Rate Resp BP   37 °C (98.6 °F) 86 18 120/90      SpO2 Temp src Heart Rate Source Patient Position   100 % -- -- --      BP Location FiO2 (%)     -- --       Physical Exam  Constitutional:       General: He is not in acute distress.     Appearance: He is well-developed. He is not toxic-appearing or diaphoretic.   HENT:      Head: Atraumatic.   Eyes:      Extraocular Movements: Extraocular movements intact.      Pupils: Pupils are equal, round, and reactive to light.   Neck:      Vascular: No JVD.   Cardiovascular:      Rate and Rhythm: Normal rate and regular rhythm.      Pulses:           Carotid pulses are 2+ on the right side and 2+ on the left side.       Radial pulses are 2+ on the right side and 2+ on the left side.        Dorsalis pedis pulses are 2+ on the right side and 2+ on the left side.        Posterior tibial pulses are 2+ on the right side and 2+ on the left side.      Heart sounds: Normal heart sounds.   Pulmonary:      Effort: No tachypnea or respiratory distress.      Breath sounds: Normal breath sounds. No stridor.   Abdominal:      Palpations: Abdomen is soft.      Tenderness: There is no abdominal tenderness. There is no guarding or rebound.   Musculoskeletal:      Cervical back: Neck supple.   Lymphadenopathy:      Cervical: No cervical adenopathy.   Skin:     Capillary Refill: Capillary  refill takes less than 2 seconds.   Neurological:      General: No focal deficit present.      Mental Status: He is alert.   Psychiatric:         Mood and Affect: Mood normal.         ED Course & MDM   ED Course as of 10/02/23 1411   Mon Oct 02, 2023   1206 Troponin I Series, High Sensitivity (0, 1 HR)  Initial troponin noted to be normal [NS]   1206 Magnesium  Initial magnesium within normal limits [NS]   1206 Comprehensive Metabolic Panel(!)  Initial CMP with mildly elevated total bili but otherwise unremarkable [NS]   1357 Troponin I Series, High Sensitivity (0, 1 HR)  Repeat troponin noted to be within normal limits [NS]   1358 XR chest 2 views  Chest x-ray noted to be without any acute findings [NS]   1410 CBC and Auto Differential(!)  CBC shows a leukopenia  [NS]      ED Course User Index  [NS] Tono Somers MD         Diagnoses as of 10/02/23 1411   Acute chest pain       Medical Decision Making  Patient presenting to the emergency department today for chest pain.  Differential includes but is not limited to potential ACS, musculoskeletal strain, muscle spasm, costochondritis, esophageal reflux, GERD, esophageal spasm, infectious etiology such as a viral versus bacterial pneumonia, myocarditis, pericarditis to name a few.  EKG blood work and x-ray were obtained patient EKG shows sinus rhythm with ventricular rate of 71, normal axis, normal intervals, no evidence of any acute STEMI or malignant arrhythmia.  See ED course for interpretation of the patient's results.  At this time on reevaluation the patient remains chest pain-free.  Patient has a heart score of 3 and therefore is low risk.  Given he is chest pain-free with normal EKG, troponins, and a low heart score I feel he is stable for discharge with outpatient follow-up to his cardiologist.  I discussed the work-up with the patient's family member and caregiver.  All questions were answered.  Patient was discharged with strict return  precautions    Amount and/or Complexity of Data Reviewed  Independent Historian: guardian and EMS  Labs:  Decision-making details documented in ED Course.  Radiology:  Decision-making details documented in ED Course.  ECG/medicine tests:  Decision-making details documented in ED Course.        Procedure  Procedures     Tono Somers MD  10/02/23 1409       Tono Somers MD  10/02/23 1413

## 2023-10-03 ENCOUNTER — OFFICE VISIT (OUTPATIENT)
Dept: PRIMARY CARE | Facility: CLINIC | Age: 48
End: 2023-10-03
Payer: MEDICARE

## 2023-10-03 VITALS
BODY MASS INDEX: 22.8 KG/M2 | TEMPERATURE: 97.3 F | WEIGHT: 145.6 LBS | DIASTOLIC BLOOD PRESSURE: 68 MMHG | OXYGEN SATURATION: 98 % | HEART RATE: 66 BPM | SYSTOLIC BLOOD PRESSURE: 108 MMHG

## 2023-10-03 DIAGNOSIS — R73.01 IMPAIRED FASTING GLUCOSE: ICD-10-CM

## 2023-10-03 DIAGNOSIS — M35.9 CONNECTIVE TISSUE DISEASE (MULTI): ICD-10-CM

## 2023-10-03 DIAGNOSIS — E55.9 VITAMIN D DEFICIENCY: ICD-10-CM

## 2023-10-03 DIAGNOSIS — R07.89 ATYPICAL CHEST PAIN: ICD-10-CM

## 2023-10-03 DIAGNOSIS — I27.20 PULMONARY HYPERTENSION (MULTI): ICD-10-CM

## 2023-10-03 DIAGNOSIS — E03.9 HYPOTHYROIDISM, UNSPECIFIED TYPE: ICD-10-CM

## 2023-10-03 DIAGNOSIS — E03.8 OTHER SPECIFIED HYPOTHYROIDISM: ICD-10-CM

## 2023-10-03 DIAGNOSIS — E78.2 MIXED HYPERLIPIDEMIA: ICD-10-CM

## 2023-10-03 DIAGNOSIS — R63.4 WEIGHT LOSS, UNINTENTIONAL: Primary | ICD-10-CM

## 2023-10-03 DIAGNOSIS — E53.8 VITAMIN B12 DEFICIENCY: ICD-10-CM

## 2023-10-03 PROBLEM — M70.21 OLECRANON BURSITIS OF RIGHT ELBOW: Status: ACTIVE | Noted: 2023-10-03

## 2023-10-03 PROBLEM — E66.9 MILD OBESITY: Status: ACTIVE | Noted: 2023-10-03

## 2023-10-03 PROBLEM — I10 BENIGN ESSENTIAL HYPERTENSION: Status: ACTIVE | Noted: 2023-10-03

## 2023-10-03 PROBLEM — I10 BENIGN ESSENTIAL HYPERTENSION: Status: RESOLVED | Noted: 2023-04-24 | Resolved: 2023-10-03

## 2023-10-03 PROCEDURE — 1036F TOBACCO NON-USER: CPT | Performed by: FAMILY MEDICINE

## 2023-10-03 PROCEDURE — 99214 OFFICE O/P EST MOD 30 MIN: CPT | Performed by: FAMILY MEDICINE

## 2023-10-03 PROCEDURE — 3008F BODY MASS INDEX DOCD: CPT | Performed by: FAMILY MEDICINE

## 2023-10-03 RX ORDER — LEVOTHYROXINE SODIUM 75 UG/1
TABLET ORAL
Qty: 90 TABLET | Refills: 3
Start: 2023-10-03 | End: 2023-12-29 | Stop reason: SDUPTHER

## 2023-10-03 ASSESSMENT — ENCOUNTER SYMPTOMS
RESPIRATORY NEGATIVE: 1
UNEXPECTED WEIGHT CHANGE: 1
CARDIOVASCULAR NEGATIVE: 1

## 2023-10-03 NOTE — ASSESSMENT & PLAN NOTE
Continue to monitor continue levothyroxine as prescribed   Left message for patient to return call to clinic.

## 2023-10-03 NOTE — ASSESSMENT & PLAN NOTE
Still with some weight loss  GI work-up essentially unremarkable  Recent hematology work-up essentially unremarkable  Weight loss may be secondary to being edentulous and having to modify his dietary habits.  They are to reevaluate potential dentures with his dentist keep a diary of his weekly weights would anticipate that his weight should start to plateau we will continue to monitor.

## 2023-10-03 NOTE — ASSESSMENT & PLAN NOTE
ER work-up unremarkable.  May have been more situational due to argument.  Clinically patient stable without chest pain complaints.  Parents do not feel need to pursue further evaluation at this time.

## 2023-10-03 NOTE — PROGRESS NOTES
Subjective   Patient ID: Navin Johnston is a 48 y.o. male who presents for Follow-up (8 week follow up.  Went to [facility] yesterday for chest pain. ).    HPI patient today with his parents for follow-up on his unintentional weight loss.  He saw the GI specialist Dr. Zuñiga underwent an EGD they did do a biopsy which came back benign.  And no other acute issues were noted from their standpoint.  Given the patient's underlying Down syndrome and mental faculties they do not feel he will tolerate a prep well enough in order to do a colonoscopy.  Next they saw hematology who ordered some additional testing including another CT scan of abdomen and pelvis which again did not reveal any acute pathology.  They did not really find any significant reason for his unintentional weight loss.  Father and mother states that they were under the impression that they do not feel that there is an underlying malignancy given how well patient is doing otherwise and how good his other lab work results appear.  Unrelated to all this patient did have an episode of chest pain yesterday while at work he went to the ER for evaluation work-up there was unremarkable.  Apparently he had an argument with a fellow worker prior to his complaint of chest pain.  When he got home he went out got the garbage cans and was back to himself has had no further problems since.  Parents think it may had to do more with the argument than anything else.    Review of Systems   Constitutional:  Positive for unexpected weight change.   Respiratory: Negative.     Cardiovascular: Negative.    Neurological:         Underlying Down syndrome       Objective   /68 (BP Location: Left arm, Patient Position: Sitting)   Pulse 66   Temp 36.3 °C (97.3 °F)   Wt 66 kg (145 lb 9.6 oz)   SpO2 98%   BMI 22.80 kg/m²     Physical Exam  Constitutional:       General: He is not in acute distress.     Appearance: He is not toxic-appearing.   Cardiovascular:       Rate and Rhythm: Normal rate and regular rhythm.      Heart sounds: Normal heart sounds.   Pulmonary:      Effort: Pulmonary effort is normal. No respiratory distress.      Breath sounds: Normal breath sounds.   Abdominal:      General: Abdomen is flat. Bowel sounds are normal.      Palpations: Abdomen is soft.      Tenderness: There is no abdominal tenderness.   Neurological:      Mental Status: Mental status is at baseline.     Recent specialty reports the ER reports reviewed with patient and parents.    For now it was decided given that everything patient's been through he did not wish to pursue any additional testing at this time with regards to weight loss.  They are going to have him reevaluated with his dentist for possible denture placement.  Continue to monitor his weight.  He was previously a big  has not been able to do that as well without his teeth.    They are to return to our office in December as they may be taking the patient with them when they go to [place] for a few months.  We will recheck fasting lab work prior to that appointment.    They declined flu shot for patient.    Assessment/Plan   Problem List Items Addressed This Visit             ICD-10-CM    Hyperlipidemia E78.5     Stable continue Lipitor 20 mg daily         Relevant Orders    Comprehensive Metabolic Panel    Lipid Panel    Hypothyroidism E03.9     Continue to monitor continue levothyroxine as prescribed         Relevant Medications    levothyroxine (Synthroid, Levoxyl) 75 mcg tablet    Other Relevant Orders    TSH with reflex to Free T4 if abnormal    Connective tissue disease (CMS/HCC) M35.9     Stable as follows rheumatology         Impaired fasting glucose R73.01     Continue dietary modification check A1c with next lab draw         Relevant Orders    Comprehensive Metabolic Panel    Hemoglobin A1C    Pulmonary hypertension (CMS/HCC) I27.20     Clinically stable         Vitamin B12 deficiency E53.8     Continue to  monitor check level with next lab draw         Relevant Orders    CBC    Vitamin B12    Vitamin D deficiency E55.9     Continue to monitor         Relevant Orders    Vitamin D 25-Hydroxy,Total (for eval of Vitamin D levels)    Weight loss, unintentional - Primary R63.4     Still with some weight loss  GI work-up essentially unremarkable  Recent hematology work-up essentially unremarkable  Weight loss may be secondary to being edentulous and having to modify his dietary habits.  They are to reevaluate potential dentures with his dentist keep a diary of his weekly weights would anticipate that his weight should start to plateau we will continue to monitor.         Atypical chest pain R07.89     ER work-up unremarkable.  May have been more situational due to argument.  Clinically patient stable without chest pain complaints.  Parents do not feel need to pursue further evaluation at this time.

## 2023-10-04 NOTE — PATIENT INSTRUCTIONS
Dr. Brown has ordered a stress test to evaluate for any blockages within the heart arteries.  Continue all current medications as prescribed.   Followup with Dr. Brown after the above test.    If you have any questions or cardiac concerns, please call our office at [phone].

## 2023-10-04 NOTE — PROGRESS NOTES
Counseling:  The patient was counseled regarding diagnostic results, instructions for management, risk factor reductions, prognosis, patient and family education, impressions, risks and benefits of treatment options and importance of compliance with treatment.      Chief Complaint:   The patient presents today for annual followup of HTN, hyperlipidemia and Tetralogy of Fallot with VSD.     History Of Present Illness:    Navin Johnston is a 48 year old male patient who presents today in the company of his father for annual followup of HTN, hyperlipidemia and Tetralogy of Fallot with VSD. His PMH is significant for HTN, connective tissue disease, Down's syndrome, hyperlipidemia, hypothyroidism, MANNY, pulmonary HTN, rheumatoid arthritis, Tetralogy of Fallot with VSD and thrombocytopenia. The patient was evaluated in the ED on 10/02/2023 for a chief complaint of chest pain with negative workup. Per the patient's father, he continues to have intermittent chest pain. He is compliant with his prescribed medications.     Last Recorded Vitals:  Vitals:    10/05/23 1603   BP: 88/58   BP Location: Left arm   Pulse: (!) 49   Weight: 66.7 kg (147 lb)   Height: 1.524 m (5')       Past Surgical History:  He has a past surgical history that includes Other surgical history (03/13/2018) and Cholecystectomy (03/13/2018).      Social History:  He reports that he has never smoked. He has never been exposed to tobacco smoke. He has never used smokeless tobacco. He reports that he does not drink alcohol and does not use drugs.    Family History:  Family History   Problem Relation Name Age of Onset    Heart disease Mother      Uterine cancer Mother      Diabetes type II Mother      Heart disease Father      Diabetes type II Father      Other (cardiovascular disease) Father      Diabetes type II Brother          Allergies:  Patient has no known allergies.    Outpatient Medications:  Current Outpatient Medications   Medication Instructions     ascorbic acid (Vitamin C) 1,000 mg tablet 1 tablet, oral, Daily    atorvastatin (LIPITOR) 20 mg, oral, Nightly    cholecalciferol (Vitamin D-3) 125 MCG (5000 UT) capsule 1 capsule, oral, Daily    furosemide (LASIX) 40 mg, oral, Daily    hydroxychloroquine (Plaquenil) 200 mg tablet 1 tablet, oral, 2 times daily    isosorbide mononitrate 10 mg, oral, Daily    levothyroxine (Synthroid, Levoxyl) 75 mcg tablet Once daily except skip Sundays    naproxen (Naprosyn) 500 mg tablet 1 tablet, oral, Daily    polyethylene glycol (Glycolax) 17 gram/dose powder oral, Daily RT    potassium chloride CR 10 mEq ER tablet 10 mEq, oral, 2 times daily     Review of Systems   Cardiovascular:  Positive for chest pain.   All other systems reviewed and are negative.    Physical Exam:  Constitutional:       Appearance: Healthy appearance. Not in distress.   Neck:      Vascular: No JVR. JVD normal.   Pulmonary:      Effort: Pulmonary effort is normal.      Breath sounds: Normal breath sounds. No wheezing. No rhonchi. No rales.   Chest:      Chest wall: Not tender to palpatation.   Cardiovascular:      PMI at left midclavicular line. Normal rate. Regular rhythm. Normal S1. Normal S2.       Murmurs: There is no murmur.      No gallop.  No click. No rub.   Pulses:     Intact distal pulses.   Edema:     Peripheral edema absent.   Abdominal:      General: Bowel sounds are normal.      Palpations: Abdomen is soft.      Tenderness: There is no abdominal tenderness.   Musculoskeletal: Normal range of motion.         General: No tenderness. Skin:     General: Skin is warm and dry.   Neurological:      General: No focal deficit present.      Mental Status: Alert and oriented to person, place and time.       Last Labs:  CBC -  Lab Results   Component Value Date    WBC 3.4 (L) 10/02/2023    HGB 15.4 10/02/2023    HCT 44.4 10/02/2023     10/02/2023     (L) 10/02/2023       CMP -  Lab Results   Component Value Date    CALCIUM 8.7 10/02/2023     PROT 6.1 (L) 10/02/2023    ALBUMIN 3.5 10/02/2023    AST 28 10/02/2023    ALT 20 10/02/2023    ALKPHOS 91 10/02/2023    BILITOT 1.3 (H) 10/02/2023       LIPID PANEL -   Lab Results   Component Value Date    CHOL 163 07/18/2023    TRIG 62 07/18/2023    HDL 45.7 07/18/2023    CHHDL 3.6 07/18/2023    LDLF 105 (H) 07/18/2023    VLDL 12 07/18/2023       RENAL FUNCTION PANEL -   Lab Results   Component Value Date    GLUCOSE 104 (H) 10/02/2023     10/02/2023    K 4.2 10/02/2023     10/02/2023    CO2 29 10/02/2023    ANIONGAP 9 (L) 10/02/2023    BUN 9 10/02/2023    CREATININE 0.65 10/02/2023    GFRMALE CANCELED 07/25/2023    CALCIUM 8.7 10/02/2023    ALBUMIN 3.5 10/02/2023        Lab Results   Component Value Date    HGBA1C 5.5 07/18/2023       Last Cardiology Tests:  11/10/2022 - TTE  1. Left ventricular systolic function is normal with a 60-65% estimated ejection fraction.  2. Abnormal flow near RVOT, possible conduit, operative report unavailable. No prior echo for comparison.  3. Moderately enlarged right ventricle.  4. There is moderately reduced right ventricular systolic function.  5. Mildly elevated RVSP.  6. Mild aortic valve stenosis.      Assessment/Plan   1) HTN  Stable  On isosorbide 10 mg once daily, furosemide 40 mg once daily  CMP 07/18/2023 stable    2) Hyperlipidemia  On atorvastatin 20 mg once daily  Lipid panel 07/18/2023 with LDL of 105    3) Tetralogy of Fallot with VSD  On furosemide 40 mg once daily  CMP 07/18/2023 stable  ED evaluation 10/02/2023 for a chief complaint of chest pain - negative workup  Patient's father endorses persistent intermittent chest pain  Check Lexiscan Cardiolite Stress Test - patient unable to tolerate treadmill stress s/t Down's Syndrome and unable to follow directions appropriately.     4) Preop clearance (11/2022)  Echo reviewed  Stable  Low risk for planned surgery from cardiac standpoint      Scribe Attestation  By signing my name below, I, Desire  Shelby Burks   attest that this documentation has been prepared under the direction and in the presence of Jameson Brown MD.

## 2023-10-05 ENCOUNTER — OFFICE VISIT (OUTPATIENT)
Dept: CARDIOLOGY | Facility: CLINIC | Age: 48
End: 2023-10-05
Payer: MEDICARE

## 2023-10-05 VITALS
BODY MASS INDEX: 28.86 KG/M2 | WEIGHT: 147 LBS | HEART RATE: 49 BPM | SYSTOLIC BLOOD PRESSURE: 88 MMHG | DIASTOLIC BLOOD PRESSURE: 58 MMHG | HEIGHT: 60 IN

## 2023-10-05 DIAGNOSIS — I10 BENIGN ESSENTIAL HYPERTENSION: ICD-10-CM

## 2023-10-05 DIAGNOSIS — Q21.3: Primary | ICD-10-CM

## 2023-10-05 DIAGNOSIS — I25.119 CHEST PAIN DUE TO CAD (CMS-HCC): ICD-10-CM

## 2023-10-05 PROCEDURE — 3008F BODY MASS INDEX DOCD: CPT | Performed by: INTERNAL MEDICINE

## 2023-10-05 PROCEDURE — 3078F DIAST BP <80 MM HG: CPT | Performed by: INTERNAL MEDICINE

## 2023-10-05 PROCEDURE — 99212 OFFICE O/P EST SF 10 MIN: CPT | Performed by: INTERNAL MEDICINE

## 2023-10-05 PROCEDURE — 3074F SYST BP LT 130 MM HG: CPT | Performed by: INTERNAL MEDICINE

## 2023-10-05 PROCEDURE — 93000 ELECTROCARDIOGRAM COMPLETE: CPT | Performed by: INTERNAL MEDICINE

## 2023-10-05 PROCEDURE — 1036F TOBACCO NON-USER: CPT | Performed by: INTERNAL MEDICINE

## 2023-10-05 ASSESSMENT — ENCOUNTER SYMPTOMS
OCCASIONAL FEELINGS OF UNSTEADINESS: 0
DEPRESSION: 0
LOSS OF SENSATION IN FEET: 0

## 2023-10-05 NOTE — LETTER
October 5, 2023     Fredy Wilson MD  [address]  [facility], [address] [place] [id number]    Patient: Navin Johnston   YOB: 1975   Date of Visit: 10/5/2023       Dear Dr. Fredy Wilson MD:    Thank you for referring Navin Johnston to me for evaluation. Below are my notes for this consultation.  If you have questions, please do not hesitate to call me. I look forward to following your patient along with you.       Sincerely,     Jameson Brown MD      CC: No Recipients  ______________________________________________________________________________________    Counseling:  The patient was counseled regarding diagnostic results, instructions for management, risk factor reductions, prognosis, patient and family education, impressions, risks and benefits of treatment options and importance of compliance with treatment.      Chief Complaint:   The patient presents today for annual followup of HTN, hyperlipidemia and Tetralogy of Fallot with VSD.     History Of Present Illness:    Navin Johnston is a 48 year old male patient who presents today in the company of his father for annual followup of HTN, hyperlipidemia and Tetralogy of Fallot with VSD. His PMH is significant for HTN, connective tissue disease, Down's syndrome, hyperlipidemia, hypothyroidism, MANNY, pulmonary HTN, rheumatoid arthritis, Tetralogy of Fallot with VSD and thrombocytopenia. The patient was evaluated in the ED on 10/02/2023 for a chief complaint of chest pain with negative workup. Per the patient's father, he continues to have intermittent chest pain. He is compliant with his prescribed medications.     Last Recorded Vitals:  Vitals:    10/05/23 1603   BP: 88/58   BP Location: Left arm   Pulse: (!) 49   Weight: 66.7 kg (147 lb)   Height: 1.524 m (5')       Past Surgical History:  He has a past surgical history that includes Other surgical history (03/13/2018) and Cholecystectomy (03/13/2018).      Social  History:  He reports that he has never smoked. He has never been exposed to tobacco smoke. He has never used smokeless tobacco. He reports that he does not drink alcohol and does not use drugs.    Family History:  Family History   Problem Relation Name Age of Onset   • Heart disease Mother     • Uterine cancer Mother     • Diabetes type II Mother     • Heart disease Father     • Diabetes type II Father     • Other (cardiovascular disease) Father     • Diabetes type II Brother          Allergies:  Patient has no known allergies.    Outpatient Medications:  Current Outpatient Medications   Medication Instructions   • ascorbic acid (Vitamin C) 1,000 mg tablet 1 tablet, oral, Daily   • atorvastatin (LIPITOR) 20 mg, oral, Nightly   • cholecalciferol (Vitamin D-3) 125 MCG (5000 UT) capsule 1 capsule, oral, Daily   • furosemide (LASIX) 40 mg, oral, Daily   • hydroxychloroquine (Plaquenil) 200 mg tablet 1 tablet, oral, 2 times daily   • isosorbide mononitrate 10 mg, oral, Daily   • levothyroxine (Synthroid, Levoxyl) 75 mcg tablet Once daily except skip Sundays   • naproxen (Naprosyn) 500 mg tablet 1 tablet, oral, Daily   • polyethylene glycol (Glycolax) 17 gram/dose powder oral, Daily RT   • potassium chloride CR 10 mEq ER tablet 10 mEq, oral, 2 times daily     Review of Systems   Cardiovascular:  Positive for chest pain.   All other systems reviewed and are negative.    Physical Exam:  Constitutional:       Appearance: Healthy appearance. Not in distress.   Neck:      Vascular: No JVR. JVD normal.   Pulmonary:      Effort: Pulmonary effort is normal.      Breath sounds: Normal breath sounds. No wheezing. No rhonchi. No rales.   Chest:      Chest wall: Not tender to palpatation.   Cardiovascular:      PMI at left midclavicular line. Normal rate. Regular rhythm. Normal S1. Normal S2.       Murmurs: There is no murmur.      No gallop.  No click. No rub.   Pulses:     Intact distal pulses.   Edema:     Peripheral edema absent.    Abdominal:      General: Bowel sounds are normal.      Palpations: Abdomen is soft.      Tenderness: There is no abdominal tenderness.   Musculoskeletal: Normal range of motion.         General: No tenderness. Skin:     General: Skin is warm and dry.   Neurological:      General: No focal deficit present.      Mental Status: Alert and oriented to person, place and time.       Last Labs:  CBC -  Lab Results   Component Value Date    WBC 3.4 (L) 10/02/2023    HGB 15.4 10/02/2023    HCT 44.4 10/02/2023     10/02/2023     (L) 10/02/2023       CMP -  Lab Results   Component Value Date    CALCIUM 8.7 10/02/2023    PROT 6.1 (L) 10/02/2023    ALBUMIN 3.5 10/02/2023    AST 28 10/02/2023    ALT 20 10/02/2023    ALKPHOS 91 10/02/2023    BILITOT 1.3 (H) 10/02/2023       LIPID PANEL -   Lab Results   Component Value Date    CHOL 163 07/18/2023    TRIG 62 07/18/2023    HDL 45.7 07/18/2023    CHHDL 3.6 07/18/2023    LDLF 105 (H) 07/18/2023    VLDL 12 07/18/2023       RENAL FUNCTION PANEL -   Lab Results   Component Value Date    GLUCOSE 104 (H) 10/02/2023     10/02/2023    K 4.2 10/02/2023     10/02/2023    CO2 29 10/02/2023    ANIONGAP 9 (L) 10/02/2023    BUN 9 10/02/2023    CREATININE 0.65 10/02/2023    GFRMALE CANCELED 07/25/2023    CALCIUM 8.7 10/02/2023    ALBUMIN 3.5 10/02/2023        Lab Results   Component Value Date    HGBA1C 5.5 07/18/2023       Last Cardiology Tests:  11/10/2022 - TTE  1. Left ventricular systolic function is normal with a 60-65% estimated ejection fraction.  2. Abnormal flow near RVOT, possible conduit, operative report unavailable. No prior echo for comparison.  3. Moderately enlarged right ventricle.  4. There is moderately reduced right ventricular systolic function.  5. Mildly elevated RVSP.  6. Mild aortic valve stenosis.      Assessment/Plan  1) HTN  Stable  On isosorbide 10 mg once daily, furosemide 40 mg once daily  CMP 07/18/2023 stable    2) Hyperlipidemia  On  atorvastatin 20 mg once daily  Lipid panel 07/18/2023 with LDL of 105    3) Tetralogy of Fallot with VSD  On furosemide 40 mg once daily  CMP 07/18/2023 stable  ED evaluation 10/02/2023 for a chief complaint of chest pain - negative workup  Patient's father endorses persistent intermittent chest pain  Check Lexiscan Cardiolite Stress Test - patient unable to tolerate treadmill stress s/t Down's Syndrome and unable to follow directions appropriately.     4) Preop clearance (11/2022)  Echo reviewed  Stable  Low risk for planned surgery from cardiac standpoint      Scribe Attestation  By signing my name below, I, Shelby Rodriguez   attest that this documentation has been prepared under the direction and in the presence of Jameson Brown MD.

## 2023-11-06 ENCOUNTER — HOSPITAL ENCOUNTER (OUTPATIENT)
Dept: RADIOLOGY | Facility: HOSPITAL | Age: 48
End: 2023-11-06
Payer: MEDICARE

## 2023-11-06 ENCOUNTER — HOSPITAL ENCOUNTER (OUTPATIENT)
Dept: CARDIOLOGY | Facility: HOSPITAL | Age: 48
Discharge: HOME | End: 2023-11-06
Payer: MEDICARE

## 2023-11-06 ENCOUNTER — HOSPITAL ENCOUNTER (OUTPATIENT)
Dept: RADIOLOGY | Facility: HOSPITAL | Age: 48
Discharge: HOME | End: 2023-11-06
Payer: MEDICARE

## 2023-11-06 DIAGNOSIS — I10 BENIGN ESSENTIAL HYPERTENSION: ICD-10-CM

## 2023-11-06 DIAGNOSIS — Q21.3: ICD-10-CM

## 2023-11-06 DIAGNOSIS — I25.119 CHEST PAIN DUE TO CAD (CMS-HCC): ICD-10-CM

## 2023-11-07 ENCOUNTER — HOSPITAL ENCOUNTER (OUTPATIENT)
Dept: CARDIOLOGY | Facility: HOSPITAL | Age: 48
Discharge: HOME | End: 2023-11-07
Payer: MEDICARE

## 2023-11-07 ENCOUNTER — APPOINTMENT (OUTPATIENT)
Dept: RADIOLOGY | Facility: HOSPITAL | Age: 48
End: 2023-11-07
Payer: MEDICARE

## 2023-11-07 ENCOUNTER — HOSPITAL ENCOUNTER (OUTPATIENT)
Dept: RADIOLOGY | Facility: HOSPITAL | Age: 48
Discharge: HOME | End: 2023-11-07
Payer: MEDICARE

## 2023-11-07 ENCOUNTER — APPOINTMENT (OUTPATIENT)
Dept: CARDIOLOGY | Facility: HOSPITAL | Age: 48
End: 2023-11-07
Payer: MEDICARE

## 2023-11-07 ENCOUNTER — TELEPHONE (OUTPATIENT)
Dept: CARDIOLOGY | Facility: CLINIC | Age: 48
End: 2023-11-07

## 2023-11-07 DIAGNOSIS — R94.39 ABNORMAL STRESS TEST: Primary | ICD-10-CM

## 2023-11-07 DIAGNOSIS — I10 BENIGN ESSENTIAL HYPERTENSION: ICD-10-CM

## 2023-11-07 PROCEDURE — 93018 CV STRESS TEST I&R ONLY: CPT | Performed by: INTERNAL MEDICINE

## 2023-11-07 PROCEDURE — 93017 CV STRESS TEST TRACING ONLY: CPT

## 2023-11-07 PROCEDURE — 78452 HT MUSCLE IMAGE SPECT MULT: CPT | Performed by: INTERNAL MEDICINE

## 2023-11-07 PROCEDURE — 93016 CV STRESS TEST SUPVJ ONLY: CPT | Performed by: INTERNAL MEDICINE

## 2023-11-07 PROCEDURE — 2500000004 HC RX 250 GENERAL PHARMACY W/ HCPCS (ALT 636 FOR OP/ED): Performed by: INTERNAL MEDICINE

## 2023-11-07 PROCEDURE — 78452 HT MUSCLE IMAGE SPECT MULT: CPT

## 2023-11-07 PROCEDURE — 3430000001 HC RX 343 DIAGNOSTIC RADIOPHARMACEUTICALS: Performed by: INTERNAL MEDICINE

## 2023-11-07 PROCEDURE — A9502 TC99M TETROFOSMIN: HCPCS | Performed by: INTERNAL MEDICINE

## 2023-11-07 RX ORDER — REGADENOSON 0.08 MG/ML
0.4 INJECTION, SOLUTION INTRAVENOUS ONCE
Status: COMPLETED | OUTPATIENT
Start: 2023-11-07 | End: 2023-11-07

## 2023-11-07 RX ADMIN — TETROFOSMIN 30 MILLICURIE: 0.23 INJECTION, POWDER, LYOPHILIZED, FOR SOLUTION INTRAVENOUS at 11:43

## 2023-11-07 RX ADMIN — REGADENOSON 0.4 MG: 0.08 INJECTION, SOLUTION INTRAVENOUS at 11:26

## 2023-11-07 RX ADMIN — TETROFOSMIN 10 MILLICURIE: 0.23 INJECTION, POWDER, LYOPHILIZED, FOR SOLUTION INTRAVENOUS at 11:43

## 2023-11-07 NOTE — TELEPHONE ENCOUNTER
----- Message from Maco Ellsworth RN sent at 11/7/2023  2:09 PM EST -----  Per Dr. Ballesteros, his stress is abnormal. Is a patient of Dr. Ballesteros

## 2023-11-07 NOTE — TELEPHONE ENCOUNTER
Nuclear stress test done today 11/7/23 and Dr. Ballesteros notified Maco BUSTOS that it was abnormal.  This is Dr. Brown's patient    Last seen: 10/5/23 by Dr. Brown  Next appointment with Dr Brown on 11/2023  Will forward to Dr. Brown to review and give plan of care.

## 2023-11-08 NOTE — TELEPHONE ENCOUNTER
I called and spoke with patient's parents and went over stress test result and plan for the CT angio of coronaries recommended by Dr. Brown. Last CMP 10/2/23 so creatinine bloodwork will need to be done. I discussed blood work and placed order. Nuha notified that CT angio was ordered and needs scheduled. Patient will need 11/20/23 appointment moved out after the CT angio completed.

## 2023-11-16 ENCOUNTER — OFFICE VISIT (OUTPATIENT)
Dept: RHEUMATOLOGY | Facility: CLINIC | Age: 48
End: 2023-11-16
Payer: MEDICARE

## 2023-11-16 ENCOUNTER — LAB (OUTPATIENT)
Dept: LAB | Facility: LAB | Age: 48
End: 2023-11-16
Payer: MEDICARE

## 2023-11-16 VITALS — WEIGHT: 148 LBS | BODY MASS INDEX: 28.9 KG/M2

## 2023-11-16 DIAGNOSIS — M05.79 RHEUMATOID ARTHRITIS INVOLVING MULTIPLE SITES WITH POSITIVE RHEUMATOID FACTOR (MULTI): Primary | ICD-10-CM

## 2023-11-16 DIAGNOSIS — M05.79 RHEUMATOID ARTHRITIS INVOLVING MULTIPLE SITES WITH POSITIVE RHEUMATOID FACTOR (MULTI): ICD-10-CM

## 2023-11-16 DIAGNOSIS — R76.8 ANA POSITIVE: ICD-10-CM

## 2023-11-16 DIAGNOSIS — I10 BENIGN ESSENTIAL HYPERTENSION: ICD-10-CM

## 2023-11-16 DIAGNOSIS — R94.39 ABNORMAL STRESS TEST: ICD-10-CM

## 2023-11-16 LAB
C3 SERPL-MCNC: 95 MG/DL (ref 87–200)
C4 SERPL-MCNC: 14 MG/DL (ref 10–50)
CREAT SERPL-MCNC: 0.55 MG/DL (ref 0.5–1.3)
CRP SERPL-MCNC: 0.3 MG/DL
ERYTHROCYTE [SEDIMENTATION RATE] IN BLOOD BY WESTERGREN METHOD: 4 MM/H (ref 0–15)
GFR SERPL CREATININE-BSD FRML MDRD: >90 ML/MIN/1.73M*2

## 2023-11-16 PROCEDURE — 1036F TOBACCO NON-USER: CPT | Performed by: INTERNAL MEDICINE

## 2023-11-16 PROCEDURE — 85652 RBC SED RATE AUTOMATED: CPT

## 2023-11-16 PROCEDURE — 86225 DNA ANTIBODY NATIVE: CPT

## 2023-11-16 PROCEDURE — 86140 C-REACTIVE PROTEIN: CPT

## 2023-11-16 PROCEDURE — 3008F BODY MASS INDEX DOCD: CPT | Performed by: INTERNAL MEDICINE

## 2023-11-16 PROCEDURE — 82565 ASSAY OF CREATININE: CPT

## 2023-11-16 PROCEDURE — 36415 COLL VENOUS BLD VENIPUNCTURE: CPT

## 2023-11-16 PROCEDURE — 99213 OFFICE O/P EST LOW 20 MIN: CPT | Performed by: INTERNAL MEDICINE

## 2023-11-16 PROCEDURE — 86160 COMPLEMENT ANTIGEN: CPT

## 2023-11-16 RX ORDER — HYDROXYCHLOROQUINE SULFATE 200 MG/1
300 TABLET, FILM COATED ORAL DAILY
Qty: 135 TABLET | Refills: 1 | Status: SHIPPED | OUTPATIENT
Start: 2023-11-16 | End: 2024-02-14 | Stop reason: WASHOUT

## 2023-11-16 NOTE — PROGRESS NOTES
"Subjective   Patient ID: Navin Johnston is a 48 y.o. male who presents for Follow-up and Arthritis.    HPI.  48-year-old male with history of seropositive RA, positive CALEB, Down syndrome, tetralogy of Fallot, secondary pulmonary hypertension, MANNY and hypothyroidism presented for follow-up.    He has chronic pain and deformity of the right middle finger PIP joints.  He bowels almost every week.  He noted pain relief after corticosteroid injection given at last visit.    He has lost significant weight.  Work-up is negative.  He has chronic leukopenia thought to be due to hydroxychloroquine.    Immunosuppression: Hydroxychloroquine.(6/24/19).    Review of Systems   All other systems reviewed and are negative.    Objective .      10/2/2023    10:58 AM 10/2/2023    12:09 PM 10/2/2023     1:11 PM 10/2/2023     2:05 PM 10/3/2023    10:55 AM 10/5/2023     4:03 PM 11/16/2023    10:31 AM   Vitals   Systolic 120 110 123 122 108 88    Diastolic 90 66 76 83 68 58    Heart Rate 86 60 73 70 66 49    Temp 37 °C (98.6 °F)    36.3 °C (97.3 °F)     Resp 18 16 18 18      Height (in) 1.702 m (5' 7\")     1.524 m (5')    Weight (lb) 150.13    145.6 147 148   BMI 23.51 kg/m2    22.8 kg/m2 28.71 kg/m2 28.9 kg/m2   BSA (m2) 1.79 m2    1.77 m2 1.68 m2 1.69 m2   Visit Report     Report Report Report      Physical Exam.  Gen. AAO x3, NAD.  HEENT: No pallor or icterus, PERRLA, EOMI.  No cervical lymphadenopathy .  Skin: No rashes.  Heart: S1, S2/ RRR.   Lungs: CTA B.  Abdomen: Soft, NT/ND.  MSK: Right middle finger PIP with tenderness and chronic mild flexion deformity.    Neuro:  Sensation to touch intact.Strength 5/5 throughout.   Psych:Appropriate mood and behavior  EXT: No edema    Assessment/Plan .  48-year-old male with history of seropositive RA, positive CALEB, Down syndrome, tetralogy of Fallot, secondary pulmonary hypertension, MANNY and hypothyroidism presented for follow-up.    #1: Seropositive RA.  Overall he is doing well however " chronic right middle finger PIP joint pain and deformity appears secondary to bowling. Discussed with the patient that intra-articular corticosteroid injection may not be beneficial if he continues to boweling.  -Discussed to use Voltaren gel 3 times a day.  -Decrease hydroxychloroquine to 300 mg daily.  Dose adjusted according to current weight.  -Labs.    Follow-up in 6 months.     This note was partially generated using the Dragon Voice recognition system. There may be some incorrect wording, spelling and/or spelling errors or punctuation errors that were not corrected prior to committing the note to the medical record.    Patient Active Problem List   Diagnosis    CALEB positive    Atlanto-axial instability    Arthritis    Chronic constipation    Diaphragmatic hernia    Down's syndrome    Eczema of hand    Hallux valgus with bunions    Hyperlipidemia    Hypothyroidism    Connective tissue disease (CMS/HCC)    Leukopenia    Impaired fasting glucose    Multiple lipomas    Obstructive sleep apnea    Paraspinal mass    Pelvic mass in male    Rheumatoid arthritis with rheumatoid factor (CMS/HCC)    Pulmonary hypertension (CMS/HCC)    Tetralogy of Fallot with VSD    Thrombocytopenia (CMS/HCC)    Situational anxiety    Seasonal allergies    Vitamin B12 deficiency    Vitamin D deficiency    Medicare annual wellness visit, subsequent    Weight loss, unintentional    Atypical chest pain    Olecranon bursitis of right elbow    Mild obesity    Weight loss, abnormal    Benign essential hypertension      Past Surgical History:   Procedure Laterality Date    CHOLECYSTECTOMY  03/13/2018    Cholecystectomy    OTHER SURGICAL HISTORY  03/13/2018    Ventricular Septal Defect Repair      Social History     Tobacco Use    Smoking status: Never     Passive exposure: Never    Smokeless tobacco: Never   Substance Use Topics    Alcohol use: Never      Family History   Problem Relation Name Age of Onset    Heart disease Mother      Uterine  cancer Mother      Diabetes type II Mother      Heart disease Father      Diabetes type II Father      Other (cardiovascular disease) Father      Diabetes type II Brother        No Known Allergies   Current Outpatient Medications   Medication Instructions    ascorbic acid (Vitamin C) 1,000 mg tablet 1 tablet, oral, Daily    atorvastatin (LIPITOR) 20 mg, oral, Nightly    cholecalciferol (Vitamin D-3) 125 MCG (5000 UT) capsule 1 capsule, oral, Daily    furosemide (LASIX) 40 mg, oral, Daily    hydroxychloroquine (PLAQUENIL) 300 mg, oral, Daily    isosorbide mononitrate 10 mg, oral, Daily    levothyroxine (Synthroid, Levoxyl) 75 mcg tablet Once daily except skip Sundays    naproxen (Naprosyn) 500 mg tablet 1 tablet, oral, Daily    polyethylene glycol (Glycolax) 17 gram/dose powder oral, Daily RT    potassium chloride CR 10 mEq ER tablet 10 mEq, oral, 2 times daily

## 2023-11-16 NOTE — PATIENT INSTRUCTIONS
Take hydroxychloroquine 1 and half pill daily.  Use Voltaren gel 3 times a day.  Take Tylenol as needed.  Call me if any question.  Follow-up in 6 months or sooner if needed.

## 2023-11-17 LAB — DSDNA AB SER-ACNC: 1 IU/ML

## 2023-11-20 ENCOUNTER — APPOINTMENT (OUTPATIENT)
Dept: CARDIOLOGY | Facility: CLINIC | Age: 48
End: 2023-11-20
Payer: MEDICARE

## 2023-12-04 ENCOUNTER — HOSPITAL ENCOUNTER (OUTPATIENT)
Dept: RADIOLOGY | Facility: HOSPITAL | Age: 48
Discharge: HOME | End: 2023-12-04
Payer: MEDICARE

## 2023-12-04 VITALS
DIASTOLIC BLOOD PRESSURE: 63 MMHG | BODY MASS INDEX: 28.47 KG/M2 | SYSTOLIC BLOOD PRESSURE: 94 MMHG | HEART RATE: 97 BPM | HEIGHT: 60 IN | OXYGEN SATURATION: 98 % | TEMPERATURE: 98.2 F | WEIGHT: 145 LBS | RESPIRATION RATE: 18 BRPM

## 2023-12-04 DIAGNOSIS — R94.39 ABNORMAL STRESS TEST: ICD-10-CM

## 2023-12-04 PROCEDURE — 75574 CT ANGIO HRT W/3D IMAGE: CPT

## 2023-12-04 PROCEDURE — 75574 CT ANGIO HRT W/3D IMAGE: CPT | Performed by: RADIOLOGY

## 2023-12-04 PROCEDURE — 2500000004 HC RX 250 GENERAL PHARMACY W/ HCPCS (ALT 636 FOR OP/ED): Performed by: NURSE PRACTITIONER

## 2023-12-04 PROCEDURE — 2550000001 HC RX 255 CONTRASTS: Performed by: INTERNAL MEDICINE

## 2023-12-04 PROCEDURE — 2500000005 HC RX 250 GENERAL PHARMACY W/O HCPCS: Performed by: NURSE PRACTITIONER

## 2023-12-04 RX ORDER — NITROGLYCERIN 0.4 MG/1
0.8 TABLET SUBLINGUAL ONCE
Status: DISCONTINUED | OUTPATIENT
Start: 2023-12-04 | End: 2023-12-05 | Stop reason: HOSPADM

## 2023-12-04 RX ORDER — METOPROLOL TARTRATE 1 MG/ML
5 INJECTION, SOLUTION INTRAVENOUS ONCE AS NEEDED
Status: DISCONTINUED | OUTPATIENT
Start: 2023-12-04 | End: 2023-12-05 | Stop reason: HOSPADM

## 2023-12-04 RX ORDER — LORAZEPAM 2 MG/ML
0.5 INJECTION INTRAMUSCULAR ONCE
Status: DISCONTINUED | OUTPATIENT
Start: 2023-12-04 | End: 2023-12-05 | Stop reason: HOSPADM

## 2023-12-04 RX ORDER — METOPROLOL TARTRATE 1 MG/ML
5 INJECTION, SOLUTION INTRAVENOUS ONCE
Status: DISCONTINUED | OUTPATIENT
Start: 2023-12-04 | End: 2023-12-05 | Stop reason: HOSPADM

## 2023-12-04 RX ORDER — METOPROLOL TARTRATE 1 MG/ML
5 INJECTION, SOLUTION INTRAVENOUS ONCE AS NEEDED
Status: COMPLETED | OUTPATIENT
Start: 2023-12-04 | End: 2023-12-04

## 2023-12-04 RX ADMIN — IOHEXOL 95 ML: 350 INJECTION, SOLUTION INTRAVENOUS at 13:02

## 2023-12-04 RX ADMIN — METOPROLOL TARTRATE 5 MG: 1 INJECTION, SOLUTION INTRAVENOUS at 12:23

## 2023-12-04 RX ADMIN — IOHEXOL 85 ML: 350 INJECTION, SOLUTION INTRAVENOUS at 13:34

## 2023-12-04 RX ADMIN — SODIUM CHLORIDE 250 ML: 9 INJECTION, SOLUTION INTRAVENOUS at 12:15

## 2023-12-04 ASSESSMENT — PAIN - FUNCTIONAL ASSESSMENT: PAIN_FUNCTIONAL_ASSESSMENT: 0-10

## 2023-12-04 ASSESSMENT — PAIN SCALES - GENERAL: PAINLEVEL_OUTOF10: 0 - NO PAIN

## 2023-12-05 ENCOUNTER — TELEPHONE (OUTPATIENT)
Dept: CARDIOLOGY | Facility: CLINIC | Age: 48
End: 2023-12-05

## 2023-12-05 ENCOUNTER — OFFICE VISIT (OUTPATIENT)
Dept: CARDIOLOGY | Facility: CLINIC | Age: 48
End: 2023-12-05
Payer: MEDICARE

## 2023-12-05 DIAGNOSIS — Q21.3: ICD-10-CM

## 2023-12-05 DIAGNOSIS — I10 BENIGN ESSENTIAL HYPERTENSION: ICD-10-CM

## 2023-12-05 NOTE — TELEPHONE ENCOUNTER
Patient accompanied to appointment today with his parents. Dr. Brown was pulled away for emergent procedure in the hospital, and they don't want to wait at this time for appointment. They were requesting result of CTA coronary arteries but result is still pending. This was completed yesterday. I let them know I would call when Dr. Brown receives and reviews result and discuss plan of care. Wondering when patient needs follow up next or if follow can be virtual.

## 2023-12-07 NOTE — TELEPHONE ENCOUNTER
CT Result is back now. Patient's parents asking for plan of care. When needs to be seen again and if it could be virtual. [place] leave out of [place] 3[place]4 months during the winter. Please advise. Thanks

## 2023-12-08 NOTE — TELEPHONE ENCOUNTER
I called and spoke with patient's mother and went over test result from Dr. Brown and plan for 1 year follow up. She was happy to hear this. She denies questions.

## 2023-12-19 ENCOUNTER — OFFICE VISIT (OUTPATIENT)
Dept: PRIMARY CARE | Facility: CLINIC | Age: 48
End: 2023-12-19
Payer: MEDICARE

## 2023-12-19 VITALS
RESPIRATION RATE: 14 BRPM | BODY MASS INDEX: 28.51 KG/M2 | TEMPERATURE: 96.6 F | HEART RATE: 58 BPM | OXYGEN SATURATION: 98 % | SYSTOLIC BLOOD PRESSURE: 103 MMHG | WEIGHT: 146 LBS | DIASTOLIC BLOOD PRESSURE: 72 MMHG

## 2023-12-19 DIAGNOSIS — D72.819 LEUKOPENIA, UNSPECIFIED TYPE: ICD-10-CM

## 2023-12-19 DIAGNOSIS — R73.01 IMPAIRED FASTING GLUCOSE: ICD-10-CM

## 2023-12-19 DIAGNOSIS — R63.4 WEIGHT LOSS, UNINTENTIONAL: ICD-10-CM

## 2023-12-19 DIAGNOSIS — R07.89 ATYPICAL CHEST PAIN: ICD-10-CM

## 2023-12-19 DIAGNOSIS — E78.2 MIXED HYPERLIPIDEMIA: ICD-10-CM

## 2023-12-19 DIAGNOSIS — H61.23 BILATERAL IMPACTED CERUMEN: ICD-10-CM

## 2023-12-19 DIAGNOSIS — E53.8 VITAMIN B12 DEFICIENCY: ICD-10-CM

## 2023-12-19 DIAGNOSIS — D69.6 THROMBOCYTOPENIA (CMS-HCC): ICD-10-CM

## 2023-12-19 DIAGNOSIS — I10 BENIGN ESSENTIAL HYPERTENSION: Primary | ICD-10-CM

## 2023-12-19 DIAGNOSIS — E03.8 OTHER SPECIFIED HYPOTHYROIDISM: ICD-10-CM

## 2023-12-19 DIAGNOSIS — E55.9 VITAMIN D DEFICIENCY: ICD-10-CM

## 2023-12-19 PROCEDURE — 3078F DIAST BP <80 MM HG: CPT | Performed by: FAMILY MEDICINE

## 2023-12-19 PROCEDURE — 3008F BODY MASS INDEX DOCD: CPT | Performed by: FAMILY MEDICINE

## 2023-12-19 PROCEDURE — 1036F TOBACCO NON-USER: CPT | Performed by: FAMILY MEDICINE

## 2023-12-19 PROCEDURE — 99214 OFFICE O/P EST MOD 30 MIN: CPT | Performed by: FAMILY MEDICINE

## 2023-12-19 PROCEDURE — 69210 REMOVE IMPACTED EAR WAX UNI: CPT | Performed by: FAMILY MEDICINE

## 2023-12-19 PROCEDURE — 3074F SYST BP LT 130 MM HG: CPT | Performed by: FAMILY MEDICINE

## 2023-12-19 ASSESSMENT — ENCOUNTER SYMPTOMS
CHILLS: 0
ARTHRALGIAS: 0
FEVER: 0
PALPITATIONS: 0
ABDOMINAL PAIN: 0
CONFUSION: 0
SHORTNESS OF BREATH: 0
CHEST TIGHTNESS: 0

## 2023-12-19 NOTE — PROGRESS NOTES
Subjective   Patient ID: Navin Johnston is a 48 y.o. male who presents for Follow-up (4 month).    HPI patient today with parents for follow-up of ongoing healthcare issues.  Did not get any of lab work done but will do so later this week.  They did see cardiology underwent a cardiac stress test which raise the possibility of some reversible ischemia subsequently he was sent for CT angiography test which came back showing no significant coronary artery disease cardiology did not feel any further workup was warranted they did not need to see him for a year.  Symptomatically he is doing well with no complaints of chest pain or shortness of breath.  On a separate note they state that they are hoping he will maintain his current weight give or take a few pounds.  Seems to be eating better since having his dentures refitted.  Also concerned that he has some waxy buildup in his ears.    Review of Systems   Constitutional:  Negative for chills and fever.   HENT:  Negative for congestion and ear pain.    Eyes:  Negative for visual disturbance.   Respiratory:  Negative for chest tightness and shortness of breath.    Cardiovascular:  Negative for chest pain and palpitations.   Gastrointestinal:  Negative for abdominal pain.   Musculoskeletal:  Negative for arthralgias.   Skin:  Negative for pallor.   Psychiatric/Behavioral:  Negative for confusion.        Objective   /72   Pulse 58   Temp 35.9 °C (96.6 °F)   Resp 14   Wt 66.2 kg (146 lb)   SpO2 98%   BMI 28.51 kg/m²     Physical Exam  Vitals and nursing note reviewed.   Constitutional:       General: He is not in acute distress.     Appearance: Normal appearance. He is not ill-appearing.   HENT:      Head: Normocephalic and atraumatic.      Right Ear: There is impacted cerumen.      Left Ear: There is impacted cerumen.      Mouth/Throat:      Pharynx: Oropharynx is clear.   Eyes:      Extraocular Movements: Extraocular movements intact.   Cardiovascular:       Rate and Rhythm: Normal rate and regular rhythm.      Pulses: Normal pulses.      Heart sounds: Normal heart sounds.   Pulmonary:      Effort: Pulmonary effort is normal.      Breath sounds: Normal breath sounds.   Abdominal:      General: Abdomen is flat. Bowel sounds are normal.      Palpations: Abdomen is soft.      Tenderness: There is no abdominal tenderness.   Musculoskeletal:         General: Normal range of motion.      Cervical back: Neck supple.   Skin:     General: Skin is warm.   Neurological:      Mental Status: He is alert and oriented to person, place, and time. Mental status is at baseline.   Psychiatric:         Mood and Affect: Mood normal.     Get fasting lab work completed this week call for results    Patient to be going down to [place] for the winter return to our office in the spring when he is back in town along with fasting lab work    Continue current medications maintain healthy diet habits  Parents refuse immunizations for patient.    Assessment/Plan   Problem List Items Addressed This Visit             ICD-10-CM    Hyperlipidemia E78.5     Continue Lipitor 20 mg daily check labs         Relevant Orders    Follow Up In Primary Care - Established    Cholesterol, LDL Direct    Comprehensive Metabolic Panel    Hypothyroidism E03.9     Continue levothyroxine 75 mcg daily check labs         Relevant Orders    Follow Up In Primary Care - Established    Comprehensive Metabolic Panel    Lipid Panel    TSH with reflex to Free T4 if abnormal    Leukopenia D72.819     Continue to monitor         Relevant Orders    Follow Up In Primary Care - Established    CBC    Impaired fasting glucose R73.01     Continue to work on diet check A1c         Relevant Orders    Follow Up In Primary Care - Established    Comprehensive Metabolic Panel    Hemoglobin A1C    Thrombocytopenia (CMS/HCC) D69.6     Check CBC         Relevant Orders    CBC    Vitamin B12 deficiency E53.8     Continue to monitor supplement as  needed         Relevant Orders    Vitamin B12    Vitamin D deficiency E55.9     Continue to monitor supplement as needed         Relevant Orders    Vitamin D 25-Hydroxy,Total (for eval of Vitamin D levels)    Weight loss, unintentional R63.4     Weight appears to have stabilized now over the past few months.  Dad points out they went back to the dentist had a refitting of the dentures and that seems to have helped.  Patient seems to be chewing better and eating better.         Atypical chest pain R07.89     Symptoms have resolved;  patient evaluated by cardiology including a stress test which showed some questionable ischemia subsequently went CT coronary angiography which was negative for any significant disease cardiology says they did not need to see him back for a year.  Symptomatically patient feels fine without any complaints of chest pain or shortness of breath.          Benign essential hypertension - Primary I10     Stable continue current treatment         Relevant Orders    Follow Up In Primary Care - Established    Comprehensive Metabolic Panel    Bilateral impacted cerumen H61.23     Bilateral ear irrigation         Relevant Orders    Ear Cerumen Removal

## 2023-12-19 NOTE — PROGRESS NOTES
Patient ID: Navin Johnston is a 48 y.o. male.    Ear Cerumen Removal    Date/Time: 12/19/2023 11:15 AM    Performed by: Vania Gaines LPN  Authorized by: Fredy Wilson MD    Consent:     Consent obtained:  Verbal    Consent given by:  Parent    Risks, benefits, and alternatives were discussed: yes    Universal protocol:     Procedure explained and questions answered to patient or proxy's satisfaction: yes      Patient identity confirmed:  Verbally with patient  Procedure details:     Location:  L ear and R ear    Procedure type: curette      Procedure outcomes: cerumen removed    Post-procedure details:     Inspection:  Ear canal clear    Procedure completion:  Tolerated well, no immediate complications

## 2023-12-19 NOTE — ASSESSMENT & PLAN NOTE
Weight appears to have stabilized now over the past few months.  Dad points out they went back to the dentist had a refitting of the dentures and that seems to have helped.  Patient seems to be chewing better and eating better.

## 2023-12-19 NOTE — ASSESSMENT & PLAN NOTE
Symptoms have resolved;  patient evaluated by cardiology including a stress test which showed some questionable ischemia subsequently went CT coronary angiography which was negative for any significant disease cardiology says they did not need to see him back for a year.  Symptomatically patient feels fine without any complaints of chest pain or shortness of breath.

## 2023-12-22 ENCOUNTER — LAB (OUTPATIENT)
Dept: LAB | Facility: LAB | Age: 48
End: 2023-12-22
Payer: MEDICARE

## 2023-12-22 DIAGNOSIS — E53.8 VITAMIN B12 DEFICIENCY: ICD-10-CM

## 2023-12-22 DIAGNOSIS — R73.01 IMPAIRED FASTING GLUCOSE: ICD-10-CM

## 2023-12-22 DIAGNOSIS — E03.8 OTHER SPECIFIED HYPOTHYROIDISM: ICD-10-CM

## 2023-12-22 DIAGNOSIS — E78.2 MIXED HYPERLIPIDEMIA: ICD-10-CM

## 2023-12-22 DIAGNOSIS — E55.9 VITAMIN D DEFICIENCY: ICD-10-CM

## 2023-12-22 LAB
25(OH)D3 SERPL-MCNC: 94 NG/ML (ref 30–100)
ALBUMIN SERPL BCP-MCNC: 3.9 G/DL (ref 3.4–5)
ALP SERPL-CCNC: 74 U/L (ref 33–120)
ALT SERPL W P-5'-P-CCNC: 14 U/L (ref 10–52)
ANION GAP SERPL CALC-SCNC: 9 MMOL/L (ref 10–20)
AST SERPL W P-5'-P-CCNC: 20 U/L (ref 9–39)
BILIRUB SERPL-MCNC: 2.1 MG/DL (ref 0–1.2)
BUN SERPL-MCNC: 8 MG/DL (ref 6–23)
CALCIUM SERPL-MCNC: 9.1 MG/DL (ref 8.6–10.3)
CHLORIDE SERPL-SCNC: 103 MMOL/L (ref 98–107)
CHOLEST SERPL-MCNC: 166 MG/DL (ref 0–199)
CHOLESTEROL/HDL RATIO: 3.1
CO2 SERPL-SCNC: 31 MMOL/L (ref 21–32)
CREAT SERPL-MCNC: 0.62 MG/DL (ref 0.5–1.3)
ERYTHROCYTE [DISTWIDTH] IN BLOOD BY AUTOMATED COUNT: 13.4 % (ref 11.5–14.5)
EST. AVERAGE GLUCOSE BLD GHB EST-MCNC: 103 MG/DL
GFR SERPL CREATININE-BSD FRML MDRD: >90 ML/MIN/1.73M*2
GLUCOSE SERPL-MCNC: 75 MG/DL (ref 74–99)
HBA1C MFR BLD: 5.2 %
HCT VFR BLD AUTO: 53.5 % (ref 41–52)
HDLC SERPL-MCNC: 54.4 MG/DL
HGB BLD-MCNC: 17.9 G/DL (ref 13.5–17.5)
LDLC SERPL CALC-MCNC: 96 MG/DL
MCH RBC QN AUTO: 34.2 PG (ref 26–34)
MCHC RBC AUTO-ENTMCNC: 33.5 G/DL (ref 32–36)
MCV RBC AUTO: 102 FL (ref 80–100)
NON HDL CHOLESTEROL: 112 MG/DL (ref 0–149)
NRBC BLD-RTO: 0 /100 WBCS (ref 0–0)
PLATELET # BLD AUTO: 131 X10*3/UL (ref 150–450)
POTASSIUM SERPL-SCNC: 4.1 MMOL/L (ref 3.5–5.3)
PROT SERPL-MCNC: 6.7 G/DL (ref 6.4–8.2)
RBC # BLD AUTO: 5.23 X10*6/UL (ref 4.5–5.9)
SODIUM SERPL-SCNC: 139 MMOL/L (ref 136–145)
TRIGL SERPL-MCNC: 76 MG/DL (ref 0–149)
TSH SERPL-ACNC: 1.92 MIU/L (ref 0.44–3.98)
VIT B12 SERPL-MCNC: 695 PG/ML (ref 211–911)
VLDL: 15 MG/DL (ref 0–40)
WBC # BLD AUTO: 3 X10*3/UL (ref 4.4–11.3)

## 2023-12-22 PROCEDURE — 36415 COLL VENOUS BLD VENIPUNCTURE: CPT

## 2023-12-22 PROCEDURE — 82306 VITAMIN D 25 HYDROXY: CPT

## 2023-12-22 PROCEDURE — 84443 ASSAY THYROID STIM HORMONE: CPT

## 2023-12-22 PROCEDURE — 80053 COMPREHEN METABOLIC PANEL: CPT

## 2023-12-22 PROCEDURE — 82607 VITAMIN B-12: CPT

## 2023-12-22 PROCEDURE — 85027 COMPLETE CBC AUTOMATED: CPT

## 2023-12-22 PROCEDURE — 80061 LIPID PANEL: CPT

## 2023-12-22 PROCEDURE — 83036 HEMOGLOBIN GLYCOSYLATED A1C: CPT

## 2023-12-29 ENCOUNTER — TELEPHONE (OUTPATIENT)
Dept: PRIMARY CARE | Facility: CLINIC | Age: 48
End: 2023-12-29
Payer: MEDICARE

## 2023-12-29 DIAGNOSIS — E03.9 HYPOTHYROIDISM, UNSPECIFIED TYPE: ICD-10-CM

## 2023-12-29 RX ORDER — LEVOTHYROXINE SODIUM 75 UG/1
TABLET ORAL
Qty: 90 TABLET | Refills: 0 | Status: SHIPPED | OUTPATIENT
Start: 2023-12-29 | End: 2024-04-02 | Stop reason: SDUPTHER

## 2023-12-29 NOTE — TELEPHONE ENCOUNTER
Rx Refill Request Telephone Encounter    Name:  Navin Johnston  :  [id number]  Medication Name:  levothyroxine (Synthroid, Levoxyl) 75 mcg     oral  daily  90    Specific Pharmacy location:  [facility]  Date of last appointment:    Date of next appointment:    Best number to reach patient:  [phone]      Father states the pharmacy deleted this order when they deleted the 100 mcg order       This request was a telephone message.  Patient is out of this medication   Please route to Counts include 234 beds at the [facility]

## 2024-02-12 ENCOUNTER — APPOINTMENT (OUTPATIENT)
Dept: HEMATOLOGY/ONCOLOGY | Facility: CLINIC | Age: 49
End: 2024-02-12
Payer: MEDICARE

## 2024-04-02 ENCOUNTER — TELEPHONE (OUTPATIENT)
Dept: PRIMARY CARE | Facility: CLINIC | Age: 49
End: 2024-04-02
Payer: MEDICARE

## 2024-04-02 DIAGNOSIS — E03.9 HYPOTHYROIDISM, UNSPECIFIED TYPE: ICD-10-CM

## 2024-04-02 RX ORDER — LEVOTHYROXINE SODIUM 75 UG/1
TABLET ORAL
Qty: 90 TABLET | Refills: 3 | Status: SHIPPED | OUTPATIENT
Start: 2024-04-02

## 2024-04-02 NOTE — TELEPHONE ENCOUNTER
Rx Refill Request Telephone Encounter    Name:  Navin Rosaskurt  :  [id number]  Medication Name:      levothyroxine (Synthroid, Levoxyl) 75 mcg tablet [[id number]]  take once daily except skip Sundays                  Specific Pharmacy location:   [facility]  Date of last appointment:  2023  Date of next appointment:  2024  Best number to reach patient:  [phone]

## 2024-05-06 ENCOUNTER — LAB (OUTPATIENT)
Dept: LAB | Facility: LAB | Age: 49
End: 2024-05-06
Payer: MEDICARE

## 2024-05-06 DIAGNOSIS — E53.8 VITAMIN B12 DEFICIENCY: ICD-10-CM

## 2024-05-06 DIAGNOSIS — E55.9 VITAMIN D DEFICIENCY: ICD-10-CM

## 2024-05-06 DIAGNOSIS — D69.6 THROMBOCYTOPENIA (CMS-HCC): ICD-10-CM

## 2024-05-06 DIAGNOSIS — E78.2 MIXED HYPERLIPIDEMIA: ICD-10-CM

## 2024-05-06 DIAGNOSIS — I10 BENIGN ESSENTIAL HYPERTENSION: ICD-10-CM

## 2024-05-06 DIAGNOSIS — D72.819 LEUKOPENIA, UNSPECIFIED TYPE: ICD-10-CM

## 2024-05-06 DIAGNOSIS — E03.8 OTHER SPECIFIED HYPOTHYROIDISM: ICD-10-CM

## 2024-05-06 DIAGNOSIS — R73.01 IMPAIRED FASTING GLUCOSE: ICD-10-CM

## 2024-05-06 LAB
25(OH)D3 SERPL-MCNC: 107 NG/ML (ref 30–100)
ALBUMIN SERPL BCP-MCNC: 3.5 G/DL (ref 3.4–5)
ALP SERPL-CCNC: 78 U/L (ref 33–120)
ALT SERPL W P-5'-P-CCNC: 16 U/L (ref 10–52)
ANION GAP SERPL CALC-SCNC: 8 MMOL/L (ref 10–20)
AST SERPL W P-5'-P-CCNC: 16 U/L (ref 9–39)
BILIRUB SERPL-MCNC: 1.5 MG/DL (ref 0–1.2)
BUN SERPL-MCNC: 11 MG/DL (ref 6–23)
CALCIUM SERPL-MCNC: 9 MG/DL (ref 8.6–10.3)
CHLORIDE SERPL-SCNC: 104 MMOL/L (ref 98–107)
CHOLEST SERPL-MCNC: 155 MG/DL (ref 0–199)
CHOLESTEROL/HDL RATIO: 3.3
CO2 SERPL-SCNC: 32 MMOL/L (ref 21–32)
CREAT SERPL-MCNC: 0.57 MG/DL (ref 0.5–1.3)
EGFRCR SERPLBLD CKD-EPI 2021: >90 ML/MIN/1.73M*2
ERYTHROCYTE [DISTWIDTH] IN BLOOD BY AUTOMATED COUNT: 15.7 % (ref 11.5–14.5)
EST. AVERAGE GLUCOSE BLD GHB EST-MCNC: 94 MG/DL
GLUCOSE SERPL-MCNC: 85 MG/DL (ref 74–99)
HBA1C MFR BLD: 4.9 %
HCT VFR BLD AUTO: 48.1 % (ref 41–52)
HDLC SERPL-MCNC: 47 MG/DL
HGB BLD-MCNC: 15.7 G/DL (ref 13.5–17.5)
LDLC SERPL CALC-MCNC: 95 MG/DL
LDLC SERPL DIRECT ASSAY-MCNC: 104 MG/DL (ref 0–129)
MCH RBC QN AUTO: 34.2 PG (ref 26–34)
MCHC RBC AUTO-ENTMCNC: 32.6 G/DL (ref 32–36)
MCV RBC AUTO: 105 FL (ref 80–100)
NON HDL CHOLESTEROL: 108 MG/DL (ref 0–149)
NRBC BLD-RTO: 0 /100 WBCS (ref 0–0)
PLATELET # BLD AUTO: 124 X10*3/UL (ref 150–450)
POTASSIUM SERPL-SCNC: 4 MMOL/L (ref 3.5–5.3)
PROT SERPL-MCNC: 6.4 G/DL (ref 6.4–8.2)
RBC # BLD AUTO: 4.59 X10*6/UL (ref 4.5–5.9)
SODIUM SERPL-SCNC: 140 MMOL/L (ref 136–145)
TRIGL SERPL-MCNC: 66 MG/DL (ref 0–149)
TSH SERPL-ACNC: 2.13 MIU/L (ref 0.44–3.98)
VIT B12 SERPL-MCNC: 763 PG/ML (ref 211–911)
VLDL: 13 MG/DL (ref 0–40)
WBC # BLD AUTO: 2.9 X10*3/UL (ref 4.4–11.3)

## 2024-05-06 PROCEDURE — 83036 HEMOGLOBIN GLYCOSYLATED A1C: CPT

## 2024-05-06 PROCEDURE — 80061 LIPID PANEL: CPT

## 2024-05-06 PROCEDURE — 82607 VITAMIN B-12: CPT

## 2024-05-06 PROCEDURE — 80053 COMPREHEN METABOLIC PANEL: CPT

## 2024-05-06 PROCEDURE — 83721 ASSAY OF BLOOD LIPOPROTEIN: CPT

## 2024-05-06 PROCEDURE — 85027 COMPLETE CBC AUTOMATED: CPT

## 2024-05-06 PROCEDURE — 84443 ASSAY THYROID STIM HORMONE: CPT

## 2024-05-06 PROCEDURE — 36415 COLL VENOUS BLD VENIPUNCTURE: CPT

## 2024-05-06 PROCEDURE — 82306 VITAMIN D 25 HYDROXY: CPT

## 2024-05-09 ENCOUNTER — OFFICE VISIT (OUTPATIENT)
Dept: PRIMARY CARE | Facility: CLINIC | Age: 49
End: 2024-05-09
Payer: MEDICARE

## 2024-05-09 VITALS
DIASTOLIC BLOOD PRESSURE: 70 MMHG | HEIGHT: 60 IN | WEIGHT: 138 LBS | BODY MASS INDEX: 27.09 KG/M2 | HEART RATE: 75 BPM | TEMPERATURE: 96.8 F | SYSTOLIC BLOOD PRESSURE: 110 MMHG | RESPIRATION RATE: 15 BRPM | OXYGEN SATURATION: 98 %

## 2024-05-09 DIAGNOSIS — Z00.00 MEDICARE ANNUAL WELLNESS VISIT, SUBSEQUENT: Primary | ICD-10-CM

## 2024-05-09 DIAGNOSIS — I27.20 PULMONARY HYPERTENSION (MULTI): ICD-10-CM

## 2024-05-09 DIAGNOSIS — E78.2 MIXED HYPERLIPIDEMIA: ICD-10-CM

## 2024-05-09 DIAGNOSIS — M05.9 RHEUMATOID ARTHRITIS WITH POSITIVE RHEUMATOID FACTOR, INVOLVING UNSPECIFIED SITE (MULTI): ICD-10-CM

## 2024-05-09 DIAGNOSIS — E03.8 OTHER SPECIFIED HYPOTHYROIDISM: ICD-10-CM

## 2024-05-09 DIAGNOSIS — R73.01 IMPAIRED FASTING GLUCOSE: ICD-10-CM

## 2024-05-09 DIAGNOSIS — D72.819 LEUKOPENIA, UNSPECIFIED TYPE: ICD-10-CM

## 2024-05-09 DIAGNOSIS — D69.6 THROMBOCYTOPENIA (CMS-HCC): ICD-10-CM

## 2024-05-09 DIAGNOSIS — E55.9 VITAMIN D DEFICIENCY: ICD-10-CM

## 2024-05-09 DIAGNOSIS — Q90.9 DOWN'S SYNDROME (HHS-HCC): ICD-10-CM

## 2024-05-09 DIAGNOSIS — I10 BENIGN ESSENTIAL HYPERTENSION: ICD-10-CM

## 2024-05-09 DIAGNOSIS — E53.8 VITAMIN B12 DEFICIENCY: ICD-10-CM

## 2024-05-09 DIAGNOSIS — Z12.5 SCREENING FOR PROSTATE CANCER: ICD-10-CM

## 2024-05-09 DIAGNOSIS — E87.6 HYPOKALEMIA: ICD-10-CM

## 2024-05-09 PROBLEM — H61.23 BILATERAL IMPACTED CERUMEN: Status: RESOLVED | Noted: 2023-12-19 | Resolved: 2024-05-09

## 2024-05-09 PROBLEM — M70.21 OLECRANON BURSITIS OF RIGHT ELBOW: Status: RESOLVED | Noted: 2023-10-03 | Resolved: 2024-05-09

## 2024-05-09 PROBLEM — E66.9 MILD OBESITY: Status: RESOLVED | Noted: 2023-10-03 | Resolved: 2024-05-09

## 2024-05-09 PROBLEM — R07.89 ATYPICAL CHEST PAIN: Status: RESOLVED | Noted: 2023-10-03 | Resolved: 2024-05-09

## 2024-05-09 PROCEDURE — 3078F DIAST BP <80 MM HG: CPT | Performed by: FAMILY MEDICINE

## 2024-05-09 PROCEDURE — G0439 PPPS, SUBSEQ VISIT: HCPCS | Performed by: FAMILY MEDICINE

## 2024-05-09 PROCEDURE — 1036F TOBACCO NON-USER: CPT | Performed by: FAMILY MEDICINE

## 2024-05-09 PROCEDURE — 3074F SYST BP LT 130 MM HG: CPT | Performed by: FAMILY MEDICINE

## 2024-05-09 PROCEDURE — 99214 OFFICE O/P EST MOD 30 MIN: CPT | Performed by: FAMILY MEDICINE

## 2024-05-09 RX ORDER — FUROSEMIDE 40 MG/1
40 TABLET ORAL DAILY
Qty: 90 TABLET | Refills: 3 | Status: SHIPPED | OUTPATIENT
Start: 2024-05-09 | End: 2025-05-09

## 2024-05-09 RX ORDER — POTASSIUM CHLORIDE 1.5 G/1.58G
20 POWDER, FOR SOLUTION ORAL DAILY
Qty: 90 PACKET | Refills: 3 | Status: SHIPPED | OUTPATIENT
Start: 2024-05-09 | End: 2025-05-09

## 2024-05-09 RX ORDER — LANOLIN ALCOHOL/MO/W.PET/CERES
1000 CREAM (GRAM) TOPICAL DAILY
COMMUNITY

## 2024-05-09 RX ORDER — HYDROXYCHLOROQUINE SULFATE 200 MG/1
200 TABLET, FILM COATED ORAL DAILY
COMMUNITY
End: 2024-05-16 | Stop reason: DRUGHIGH

## 2024-05-09 RX ORDER — ISOSORBIDE MONONITRATE 10 MG/1
10 TABLET ORAL DAILY
Qty: 90 TABLET | Refills: 3 | Status: SHIPPED | OUTPATIENT
Start: 2024-05-09 | End: 2025-05-09

## 2024-05-09 ASSESSMENT — PATIENT HEALTH QUESTIONNAIRE - PHQ9
1. LITTLE INTEREST OR PLEASURE IN DOING THINGS: NOT AT ALL
SUM OF ALL RESPONSES TO PHQ9 QUESTIONS 1 AND 2: 0
2. FEELING DOWN, DEPRESSED OR HOPELESS: NOT AT ALL

## 2024-05-09 ASSESSMENT — ACTIVITIES OF DAILY LIVING (ADL)
MANAGING_FINANCES: TOTAL CARE
BATHING: INDEPENDENT
GROCERY_SHOPPING: TOTAL CARE
TAKING_MEDICATION: TOTAL CARE
DRESSING: INDEPENDENT
DOING_HOUSEWORK: INDEPENDENT

## 2024-05-09 ASSESSMENT — ENCOUNTER SYMPTOMS
ABDOMINAL PAIN: 0
CONFUSION: 0
PALPITATIONS: 0
ARTHRALGIAS: 0
SHORTNESS OF BREATH: 0
CHILLS: 0
CHEST TIGHTNESS: 0
FEVER: 0

## 2024-05-09 NOTE — ASSESSMENT & PLAN NOTE
Clinically stable patient lives with family.  Works at local [employer].  Engaged in family and social activities.

## 2024-05-09 NOTE — PROGRESS NOTES
Subjective   Reason for Visit: Navin Johnston is an 49 y.o. male here for a Medicare Wellness visit.     Past Medical, Surgical, and Family History reviewed and updated in chart.    Reviewed all medications by prescribing practitioner or clinical pharmacist (such as prescriptions, OTCs, herbal therapies and supplements) and documented in the medical record.    HPI patient today with mom for follow-up of ongoing healthcare issues overall patient is been doing well.  He mom states that he is been eating better.  She has been monitoring his weight at home over the past couple of months she feels he is stabilized right around the 135 to 140 pound rayne on her scale.  She also wanted to see about changing the potassium supplement to some sort of the liquid form due to difficulty swallowing the potassium pills.  Patient Care Team:  Fredy Wilson MD as PCP - General (Family Medicine)  Fredy Wilson MD as PCP - [facility] Attributed Provider     Review of Systems   Constitutional:  Negative for chills and fever.   HENT:  Negative for congestion and ear pain.    Eyes:  Negative for visual disturbance.   Respiratory:  Negative for chest tightness and shortness of breath.    Cardiovascular:  Negative for chest pain and palpitations.   Gastrointestinal:  Negative for abdominal pain.   Musculoskeletal:  Negative for arthralgias.   Skin:  Negative for pallor.   Psychiatric/Behavioral:  Negative for confusion.        Objective   Vitals:  /70   Pulse 75   Temp 36 °C (96.8 °F)   Resp 15   Ht 1.524 m (5')   Wt 62.6 kg (138 lb)   SpO2 98%   BMI 26.95 kg/m²       Physical Exam  Vitals and nursing note reviewed.   Constitutional:       General: He is not in acute distress.     Appearance: He is not ill-appearing.      Comments: Down syndrome features   HENT:      Head: Normocephalic and atraumatic.      Right Ear: Tympanic membrane, ear canal and external ear normal.      Left Ear: Tympanic membrane, ear canal and  external ear normal.      Mouth/Throat:      Pharynx: Oropharynx is clear.   Eyes:      Extraocular Movements: Extraocular movements intact.   Cardiovascular:      Rate and Rhythm: Normal rate and regular rhythm.      Pulses: Normal pulses.      Heart sounds: Normal heart sounds.   Pulmonary:      Effort: Pulmonary effort is normal.      Breath sounds: Normal breath sounds.   Abdominal:      General: Abdomen is flat. Bowel sounds are normal.      Palpations: Abdomen is soft.      Tenderness: There is no abdominal tenderness.   Musculoskeletal:         General: Normal range of motion.      Cervical back: Neck supple.   Skin:     General: Skin is warm.   Neurological:      Mental Status: He is alert and oriented to person, place, and time. Mental status is at baseline.   Psychiatric:         Mood and Affect: Mood normal.       Recent Results (from the past 1008 hour(s))   CBC    Collection Time: 05/06/24  9:12 AM   Result Value Ref Range    WBC 2.9 (L) 4.4 - 11.3 x10*3/uL    nRBC 0.0 0.0 - 0.0 /100 WBCs    RBC 4.59 4.50 - 5.90 x10*6/uL    Hemoglobin 15.7 13.5 - 17.5 g/dL    Hematocrit 48.1 41.0 - 52.0 %     (H) 80 - 100 fL    MCH 34.2 (H) 26.0 - 34.0 pg    MCHC 32.6 32.0 - 36.0 g/dL    RDW 15.7 (H) 11.5 - 14.5 %    Platelets 124 (L) 150 - 450 x10*3/uL   Cholesterol, LDL Direct    Collection Time: 05/06/24  9:12 AM   Result Value Ref Range    LDL, Direct 104 0 - 129 mg/dL   Comprehensive Metabolic Panel    Collection Time: 05/06/24  9:12 AM   Result Value Ref Range    Glucose 85 74 - 99 mg/dL    Sodium 140 136 - 145 mmol/L    Potassium 4.0 3.5 - 5.3 mmol/L    Chloride 104 98 - 107 mmol/L    Bicarbonate 32 21 - 32 mmol/L    Anion Gap 8 (L) 10 - 20 mmol/L    Urea Nitrogen 11 6 - 23 mg/dL    Creatinine 0.57 0.50 - 1.30 mg/dL    eGFR >90 >60 mL/min/1.73m*2    Calcium 9.0 8.6 - 10.3 mg/dL    Albumin 3.5 3.4 - 5.0 g/dL    Alkaline Phosphatase 78 33 - 120 U/L    Total Protein 6.4 6.4 - 8.2 g/dL    AST 16 9 - 39 U/L     Bilirubin, Total 1.5 (H) 0.0 - 1.2 mg/dL    ALT 16 10 - 52 U/L   Hemoglobin A1C    Collection Time: 05/06/24  9:12 AM   Result Value Ref Range    Hemoglobin A1C 4.9 see below %    Estimated Average Glucose 94 Not Established mg/dL   Lipid Panel    Collection Time: 05/06/24  9:12 AM   Result Value Ref Range    Cholesterol 155 0 - 199 mg/dL    HDL-Cholesterol 47.0 mg/dL    Cholesterol/HDL Ratio 3.3     LDL Calculated 95 <=99 mg/dL    VLDL 13 0 - 40 mg/dL    Triglycerides 66 0 - 149 mg/dL    Non HDL Cholesterol 108 0 - 149 mg/dL   Vitamin D 25-Hydroxy,Total (for eval of Vitamin D levels)    Collection Time: 05/06/24  9:12 AM   Result Value Ref Range    Vitamin D, 25-Hydroxy, Total 107 (H) 30 - 100 ng/mL   Vitamin B12    Collection Time: 05/06/24  9:12 AM   Result Value Ref Range    Vitamin B12 763 211 - 911 pg/mL   TSH with reflex to Free T4 if abnormal    Collection Time: 05/06/24  9:12 AM   Result Value Ref Range    Thyroid Stimulating Hormone 2.13 0.44 - 3.98 mIU/L     Recent labs reviewed with patient and mother overall numbers are stable vitamin D was slightly elevated they were advised to stop vitamin D3 we will continue to monitor.    He has lost some weight since December but again mother says that over recent months he is stabilized we will continue to monitor.  He has undergone already an extensive workup with regards to weight loss without any definitive etiology.  Still struggling with dentures which we believe is a component to the change in weight and eating habits.    Chronic leukopenia and thrombocytopenia will refer back to hematology for an update as it will be 1 year since last seen at the end of the summer.    We discussed colon cancer screening at this point mom's not interested in pursuing colonoscopy or Cologuard stating she does not feel he can tolerate the prep and the risk of a false positive on the Cologuard she is not sure she wants to go through that and the necessary follow-up that would  ensue.    Continue to follow with rheumatology    Return to our office in 4 months with repeat fasting labs    Assessment/Plan   Problem List Items Addressed This Visit       Down's syndrome ([facility])    Current Assessment & Plan     Clinically stable patient lives with family.  Works at local [employer]  Engaged in family and social activities.         Hyperlipidemia    Current Assessment & Plan     Stable continue dietary modification along with Lipitor 20 mg daily         Relevant Orders    CBC    Comprehensive Metabolic Panel    Lipid Panel    Follow Up In Primary Care - Established    Hypothyroidism    Current Assessment & Plan     Continue levothyroxine 75 mcg daily         Relevant Orders    TSH with reflex to Free T4 if abnormal    Follow Up In Primary Care - Established    Leukopenia    Current Assessment & Plan     Chronic and stable refer back to hematology for an update         Relevant Orders    CBC    Comprehensive Metabolic Panel    Referral to Hematology and Oncology    Follow Up In Primary Care - Established    Impaired fasting glucose    Current Assessment & Plan     A1c is improved now down to normal at 4.9% continue lifestyle modifications         Relevant Orders    Comprehensive Metabolic Panel    Hemoglobin A1C    Follow Up In Primary Care - Established    Rheumatoid arthritis with rheumatoid factor (Multi)    Current Assessment & Plan     Patient follows with rheumatology is on Plaquenil.         Pulmonary hypertension (Multi)    Current Assessment & Plan     Clinically stable previously seen by cardiology fall 2023         Thrombocytopenia (CMS-[facility])    Current Assessment & Plan     Chronic and stable refer back to hematology for update         Relevant Orders    CBC    Comprehensive Metabolic Panel    Referral to Hematology and Oncology    Follow Up In Primary Care - Established    Vitamin B12 deficiency    Current Assessment & Plan     Continue to monitor and supplement         Relevant  Orders    Vitamin B12    Vitamin D deficiency    Current Assessment & Plan     Vitamin D level slightly elevated stop vitamin D supplement we will continue to monitor         Relevant Orders    Vitamin D 25-Hydroxy,Total (for eval of Vitamin D levels)    Medicare annual wellness visit, subsequent - Primary    Current Assessment & Plan     Recent labs reviewed    We discussed colon cancer screening mom does not feel patient can tolerate the prep.  They have also discussed this already with gastroenterology within the past 12 months.  Also we reviewed Cologuard given that he is not considered high risk.  Mom says she also discussed this with the gastroenterologist they did discuss the possibility of a false positive result which would then lead to recommendation for colonoscopy.  Right now mom is not ready to commit to any screening test under the circumstances.         Benign essential hypertension    Current Assessment & Plan     Stable continue current treatment         Relevant Medications    furosemide (Lasix) 40 mg tablet    isosorbide mononitrate 10 mg tablet    Other Relevant Orders    CBC    Comprehensive Metabolic Panel    Follow Up In Primary Care - Established    Hypokalemia    Current Assessment & Plan     Mom asked that we switch to liquid form of potassium due to patient having a hard time swallowing the potassium pill.  Prescription switched to potassium chloride Klor-Con 20 mill equivalent packet contents of 20 mEq once daily.         Relevant Medications    potassium chloride (Klor-Con) 20 mEq packet    Other Relevant Orders    Comprehensive Metabolic Panel    Screening for prostate cancer    Current Assessment & Plan     Screening PSA with next lab draw         Relevant Orders    Prostate Specific Antigen, Screen

## 2024-05-09 NOTE — ASSESSMENT & PLAN NOTE
Mom asked that we switch to liquid form of potassium due to patient having a hard time swallowing the potassium pill.  Prescription switched to potassium chloride Klor-Con 20 mill equivalent packet contents of 20 mEq once daily.

## 2024-05-09 NOTE — ASSESSMENT & PLAN NOTE
Recent labs reviewed    We discussed colon cancer screening mom does not feel patient can tolerate the prep.  They have also discussed this already with gastroenterology within the past 12 months.  Also we reviewed Cologuard given that he is not considered high risk.  Mom says she also discussed this with the gastroenterologist they did discuss the possibility of a false positive result which would then lead to recommendation for colonoscopy.  Right now mom is not ready to commit to any screening test under the circumstances.

## 2024-05-13 DIAGNOSIS — D72.819 LEUKOPENIA, UNSPECIFIED TYPE: ICD-10-CM

## 2024-05-13 DIAGNOSIS — D69.6 THROMBOCYTOPENIA (CMS-HCC): ICD-10-CM

## 2024-05-16 ENCOUNTER — OFFICE VISIT (OUTPATIENT)
Dept: RHEUMATOLOGY | Facility: CLINIC | Age: 49
End: 2024-05-16
Payer: MEDICARE

## 2024-05-16 VITALS — BODY MASS INDEX: 26.95 KG/M2 | WEIGHT: 138 LBS

## 2024-05-16 DIAGNOSIS — M05.79 RHEUMATOID ARTHRITIS INVOLVING MULTIPLE SITES WITH POSITIVE RHEUMATOID FACTOR (MULTI): Primary | ICD-10-CM

## 2024-05-16 DIAGNOSIS — R76.8 ANA POSITIVE: ICD-10-CM

## 2024-05-16 PROCEDURE — 99213 OFFICE O/P EST LOW 20 MIN: CPT | Performed by: INTERNAL MEDICINE

## 2024-05-16 PROCEDURE — 1036F TOBACCO NON-USER: CPT | Performed by: INTERNAL MEDICINE

## 2024-05-16 RX ORDER — HYDROXYCHLOROQUINE SULFATE 200 MG/1
300 TABLET, FILM COATED ORAL DAILY
Qty: 135 TABLET | Refills: 1 | Status: SHIPPED | OUTPATIENT
Start: 2024-05-16 | End: 2024-08-14

## 2024-05-16 RX ORDER — HYDROXYCHLOROQUINE SULFATE 200 MG/1
300 TABLET, FILM COATED ORAL DAILY
Qty: 135 TABLET | Refills: 1 | Status: SHIPPED | OUTPATIENT
Start: 2024-05-16 | End: 2024-05-16

## 2024-05-16 NOTE — PATIENT INSTRUCTIONS
Use Voltaren gel 3 times a day.  Take Tylenol as needed.  Consider to see dermatology for hair loss.  Call me if any question.  Follow-up in 6 months or sooner if needed.

## 2024-05-16 NOTE — PROGRESS NOTES
Subjective . Navin Johnston is a 49 y.o. male who presents for Follow-up.    HPI. 49-year-old male with history of seropositive RA, positive CALEB, Down syndrome, tetralogy of Fallot, secondary pulmonary hypertension, MANNY and hypothyroidism presented for follow-up.     He has chronic right middle finger PIP joint pain with mild flexion deformity.  He participates in bowling 3-4 times per week.  Father stated that his hair is thinning.    Immunosuppression: Hydroxychloroquine.(6/24/19).     Review of Systems   All other systems reviewed and are negative.    Objective     Weight 62.6 kg (138 lb).    Physical Exam.  Gen. AAO x3, NAD.  HEENT: No pallor or icterus, PERRLA, EOMI.   Skin: No rashes.  Heart: S1, S2/ RRR.   Lungs: CTA B.  Abdomen: Soft, NT/ND, BS regular.  MSK: Mild flexion deformity and tenderness of the right middle finger PIP joint.  Right knee with patellofemoral crepitation.  Neuro: Sensation to touch intact.Strength 5/5 throughout.   Psych:Appropriate mood and behavior  EXT: No edema    Assessment/Plan . 49-year-old male with history of seropositive RA, positive CALEB, Down syndrome, tetralogy of Fallot, secondary pulmonary hypertension, MANNY and hypothyroidism presented for follow-up.     #1:#1: Seropositive RA.  Chronic pain and deformity of the right middle finger PIP joint appears due to bowling.  Discussed to use Voltaren gel 3 times a day and take Tylenol as needed.  -Discussed with father that hair thinning does not appear secondary to hydroxychloroquine use.  Advised to follow-up with dermatology.  -Continue hydroxychloroquine 300 mg daily.  -Labs.    #2: Leukopenia/thrombocytopenia.  He is following hematology.    Follow-up in 6 months.     This note was partially generated using the Dragon Voice recognition system. There may be some incorrect wording, spelling and/or spelling errors or punctuation errors that were not corrected prior to committing the note to the medical record.        Problem  List Items Addressed This Visit       CALEB positive    Relevant Orders    C3 Complement    C4 Complement    Anti-DNA Antibody, Double-Stranded    Rheumatoid arthritis with rheumatoid factor (Multi) - Primary    Relevant Medications    hydroxychloroquine (Plaquenil) 200 mg tablet    Other Relevant Orders    C-Reactive Protein    Sedimentation Rate            Active Ambulatory Problems     Diagnosis Date Noted    CALEB positive 04/24/2023    [place]-axial instability 04/24/2023    Arthritis 04/24/2023    Chronic constipation 04/24/2023    Diaphragmatic hernia 04/24/2023    Down's syndrome ([facility]) 04/24/2023    Eczema of hand 04/24/2023    Hallux valgus with bunions 04/24/2023    Hyperlipidemia 04/24/2023    Hypothyroidism 04/24/2023    Connective tissue disease (Multi) 04/24/2023    Leukopenia 04/24/2023    Impaired fasting glucose 04/24/2023    Multiple lipomas 04/24/2023    Obstructive sleep apnea 04/24/2023    Paraspinal mass 04/24/2023    Pelvic mass in male 04/24/2023    Rheumatoid arthritis with rheumatoid factor (Multi) 04/24/2023    Pulmonary hypertension (Multi) 04/24/2023    Tetralogy of Fallot with VSD ([facility]) 04/24/2023    Thrombocytopenia (CMS-HCC) 04/24/2023    Situational anxiety 04/24/2023    Seasonal allergies 04/24/2023    Vitamin B12 deficiency 04/24/2023    Vitamin D deficiency 04/24/2023    Medicare annual wellness visit, subsequent 04/24/2023    Weight loss, unintentional 04/24/2023    Benign essential hypertension 10/03/2023    Hypokalemia 05/09/2024    Screening for prostate cancer 05/09/2024     Resolved Ambulatory Problems     Diagnosis Date Noted    Benign essential hypertension 04/24/2023    Atypical chest pain 10/03/2023    Olecranon bursitis of right elbow 10/03/2023    Mild obesity 10/03/2023    Bilateral impacted cerumen 12/19/2023     Past Medical History:   Diagnosis Date    Obstructive sleep apnea (adult) (pediatric)     Other conditions influencing health status 03/16/2018     Personal history of other diseases of the digestive system     Personal history of other diseases of the musculoskeletal system and connective tissue     Personal history of other endocrine, nutritional and metabolic disease     Personal history of other endocrine, nutritional and metabolic disease     Syncope and collapse     Unspecified hearing loss, bilateral        Family History   Problem Relation Name Age of Onset    Heart disease Mother      Uterine cancer Mother      Diabetes type II Mother      Heart disease Father      Diabetes type II Father      Other (cardiovascular disease) Father      Diabetes type II Brother         Past Surgical History:   Procedure Laterality Date    CHOLECYSTECTOMY  03/13/2018    Cholecystectomy    CT ANGIO CORONARY ART WITH HEARTFLOW IF SCORE >30%  12/4/2023    CT ANGIO CORONARY ART WITH HEARTFLOW IF SCORE >30% 12/4/2023 [facility] CT    OTHER SURGICAL HISTORY  03/13/2018    Ventricular Septal Defect Repair       Social History     Tobacco Use   Smoking Status Never    Passive exposure: Never   Smokeless Tobacco Never       Allergies  Patient has no known allergies.    Current Meds  Current Outpatient Medications   Medication Instructions    ascorbic acid (Vitamin C) 1,000 mg tablet 1 tablet, oral, Daily    atorvastatin (LIPITOR) 20 mg, oral, Nightly    cyanocobalamin (VITAMIN B-12) 1,000 mcg, oral, Daily    furosemide (LASIX) 40 mg, oral, Daily    hydroxychloroquine (PLAQUENIL) 300 mg, oral, Daily    isosorbide mononitrate 10 mg, oral, Daily    levothyroxine (Synthroid, Levoxyl) 75 mcg tablet Once daily except skip Sundays    naproxen (Naprosyn) 500 mg tablet 1 tablet, oral, Daily    polyethylene glycol (Glycolax) 17 gram/dose powder oral, Daily RT    potassium chloride (Klor-Con) 20 mEq packet 20 mEq, oral, Daily        Lab Results   Component Value Date    ANATITER 1:320 05/09/2019    C3 95 11/16/2023    RF 18 (H) 06/24/2019    SEDRATE 4 11/16/2023    CRP 0.30 11/16/2023    URICACID  7.0 08/25/2022    DNADS 1.0 11/16/2023    CKTOTAL 70 05/09/2019        Neil Shaffer MD

## 2024-07-01 ENCOUNTER — OFFICE VISIT (OUTPATIENT)
Dept: HEMATOLOGY/ONCOLOGY | Facility: CLINIC | Age: 49
End: 2024-07-01
Payer: COMMERCIAL

## 2024-07-01 ENCOUNTER — LAB (OUTPATIENT)
Dept: LAB | Facility: CLINIC | Age: 49
End: 2024-07-01
Payer: MEDICARE

## 2024-07-01 VITALS
WEIGHT: 138.01 LBS | TEMPERATURE: 97.9 F | RESPIRATION RATE: 16 BRPM | DIASTOLIC BLOOD PRESSURE: 67 MMHG | OXYGEN SATURATION: 100 % | HEART RATE: 69 BPM | BODY MASS INDEX: 26.95 KG/M2 | SYSTOLIC BLOOD PRESSURE: 112 MMHG

## 2024-07-01 DIAGNOSIS — D69.6 THROMBOCYTOPENIA (CMS-HCC): ICD-10-CM

## 2024-07-01 DIAGNOSIS — D72.819 LEUKOPENIA, UNSPECIFIED TYPE: ICD-10-CM

## 2024-07-01 LAB
ALBUMIN SERPL BCP-MCNC: 3.7 G/DL (ref 3.4–5)
ALP SERPL-CCNC: 109 U/L (ref 33–120)
ALT SERPL W P-5'-P-CCNC: 38 U/L (ref 10–52)
ANION GAP SERPL CALC-SCNC: 10 MMOL/L (ref 10–20)
AST SERPL W P-5'-P-CCNC: 35 U/L (ref 9–39)
BASOPHILS # BLD AUTO: 0.05 X10*3/UL (ref 0–0.1)
BASOPHILS NFR BLD AUTO: 1.2 %
BILIRUB SERPL-MCNC: 1.7 MG/DL (ref 0–1.2)
BUN SERPL-MCNC: 12 MG/DL (ref 6–23)
CALCIUM SERPL-MCNC: 9 MG/DL (ref 8.6–10.3)
CHLORIDE SERPL-SCNC: 104 MMOL/L (ref 98–107)
CO2 SERPL-SCNC: 30 MMOL/L (ref 21–32)
CREAT SERPL-MCNC: 0.66 MG/DL (ref 0.5–1.3)
EGFRCR SERPLBLD CKD-EPI 2021: >90 ML/MIN/1.73M*2
EOSINOPHIL # BLD AUTO: 0.04 X10*3/UL (ref 0–0.7)
EOSINOPHIL NFR BLD AUTO: 0.9 %
ERYTHROCYTE [DISTWIDTH] IN BLOOD BY AUTOMATED COUNT: 13.4 % (ref 11.5–14.5)
FERRITIN SERPL-MCNC: 253 NG/ML (ref 20–300)
FOLATE SERPL-MCNC: 8.8 NG/ML
GLUCOSE SERPL-MCNC: 114 MG/DL (ref 74–99)
HCT VFR BLD AUTO: 47.6 % (ref 41–52)
HGB BLD-MCNC: 16.4 G/DL (ref 13.5–17.5)
IMM GRANULOCYTES # BLD AUTO: 0.01 X10*3/UL (ref 0–0.7)
IMM GRANULOCYTES NFR BLD AUTO: 0.2 % (ref 0–0.9)
IRON SATN MFR SERPL: 32 % (ref 25–45)
IRON SERPL-MCNC: 91 UG/DL (ref 35–150)
LYMPHOCYTES # BLD AUTO: 1.16 X10*3/UL (ref 1.2–4.8)
LYMPHOCYTES NFR BLD AUTO: 27.3 %
MCH RBC QN AUTO: 35 PG (ref 26–34)
MCHC RBC AUTO-ENTMCNC: 34.5 G/DL (ref 32–36)
MCV RBC AUTO: 102 FL (ref 80–100)
MONOCYTES # BLD AUTO: 0.41 X10*3/UL (ref 0.1–1)
MONOCYTES NFR BLD AUTO: 9.6 %
NEUTROPHILS # BLD AUTO: 2.58 X10*3/UL (ref 1.2–7.7)
NEUTROPHILS NFR BLD AUTO: 60.8 %
NRBC BLD-RTO: 0 /100 WBCS (ref 0–0)
PLATELET # BLD AUTO: 126 X10*3/UL (ref 150–450)
POTASSIUM SERPL-SCNC: 4.1 MMOL/L (ref 3.5–5.3)
PROT SERPL-MCNC: 6.7 G/DL (ref 6.4–8.2)
RBC # BLD AUTO: 4.69 X10*6/UL (ref 4.5–5.9)
SODIUM SERPL-SCNC: 140 MMOL/L (ref 136–145)
TIBC SERPL-MCNC: 282 UG/DL (ref 240–445)
UIBC SERPL-MCNC: 191 UG/DL (ref 110–370)
WBC # BLD AUTO: 4.3 X10*3/UL (ref 4.4–11.3)

## 2024-07-01 PROCEDURE — 82746 ASSAY OF FOLIC ACID SERUM: CPT | Mod: PARLAB | Performed by: INTERNAL MEDICINE

## 2024-07-01 PROCEDURE — 1036F TOBACCO NON-USER: CPT | Performed by: INTERNAL MEDICINE

## 2024-07-01 PROCEDURE — 83540 ASSAY OF IRON: CPT | Performed by: INTERNAL MEDICINE

## 2024-07-01 PROCEDURE — 3074F SYST BP LT 130 MM HG: CPT | Performed by: INTERNAL MEDICINE

## 2024-07-01 PROCEDURE — 80053 COMPREHEN METABOLIC PANEL: CPT | Performed by: INTERNAL MEDICINE

## 2024-07-01 PROCEDURE — 3078F DIAST BP <80 MM HG: CPT | Performed by: INTERNAL MEDICINE

## 2024-07-01 PROCEDURE — 99213 OFFICE O/P EST LOW 20 MIN: CPT | Performed by: INTERNAL MEDICINE

## 2024-07-01 PROCEDURE — 36415 COLL VENOUS BLD VENIPUNCTURE: CPT

## 2024-07-01 PROCEDURE — 85025 COMPLETE CBC W/AUTO DIFF WBC: CPT | Performed by: INTERNAL MEDICINE

## 2024-07-01 PROCEDURE — 82728 ASSAY OF FERRITIN: CPT | Mod: PARLAB | Performed by: INTERNAL MEDICINE

## 2024-07-01 ASSESSMENT — PAIN SCALES - GENERAL: PAINLEVEL: 0-NO PAIN

## 2024-07-01 NOTE — PROGRESS NOTES
MARCO ESTEVEZ is a 48 year old Male        Interval History:    History of present illness:      The patient is a 48-year-old man with past medical history of leukopenia, fluctuating thrombocytopenia, intentional weight loss.  At his heaviest the patient was 226 pounds from there he has slowly come down to 153 pounds over last 3 years.  The patient  did have his teeth pulled and now wears dentures.  However he states he manipulates his dentures often and does not like eating with them.  Reports his caloric intake has decreased.  He reports good energy levels.  The patient likes to go bowling when  in league and enjoys going dancing.  The patient has a routine which she follows daily does not deviate from it.  His parents have noticed loss in abdominal girth.  He was evaluated 1 year earlier with CT scan of abdomen and pelvis no obvious etiology  was established for weight loss other than poor dentition and poor caloric intake.  The patient has a history of rheumatoid arthritis with positive CALEB 1: 320 has been treated with hydroxychloroquine dating back to June 2019.  The patient was referred  for further evaluation and management.         At interview on July 25, 2023 the patient's parents narrated entire history.  Information obtained from patient's chart as well.  Other than weight loss the patient denies history of fevers night sweats recurrent localized or systemic infection chest  pain shortness of breath nausea vomiting hematemesis melena hematochezia hematuria.      The patient and his mother had called for evaluation on August 18, 2023 regarding history of unexplained weight loss.  The patient claimed that he was doing reasonably well.    The patient and family had come for follow-up on July 1, 2024 regarding history of thrombocytopenia and unintentional weight loss.  Apparently the patient had bad dentition in acquired a new set of dentures not fitting well does culminating in poor oral intake and  weight loss.  The patient is beginning to eat well and the weight is stabilized around 137 pounds.  Denies history of recurrent localized or systemic bleeding.     Past medical history:      Down syndrome, tetralogy of Fallot, hyperlipidemia, hypothyroidism, MANNY, secondary pulmonary hypertension, rheumatoid arthritis.      Past surgical history:      Corrective surgery for tetralogy of Fallot.      Colonoscopy planned for coming week.      Personal history and social history:      48 years old.  Single.  Works as an .  Lives in a house with his parents.  No history of smoking alcohol abuse or drug abuse.     Review of Systems:   ·  System Review All other systems have been reviewed and are negative for complaint.            Allergies and Intolerances:       Allergies:         No Known Allergies: Active     Outpatient Medication Profile:  * Patient Currently Takes Medications as of 25-Jul-2023 14:06 documented in Structured Notes         potassium chloride 10 mEq oral capsule,  extended release: 1 cap(s) orally once a day         hydroxychloroquine 200 mg oral tablet: 1 tab(s) orally 2 times a day         levothyroxine 100 mcg (0.1 mg) oral tablet: 1 tab(s) orally once a day         Lipitor 20 mg oral tablet: 1 tab(s) orally once a day         Vitamin D2: 1 tab(s) orally once a day         atorvastatin 20 mg oral tablet: 1 tab(s) orally once a day         furosemide 40 mg oral tablet: 1 tab(s) orally once a day         isosorbide mononitrate 10 mg oral tablet: 1 tab(s) orally 2 times a day         Klor-Con 10 oral tablet, extended release: 1 tab(s) orally 2 times a  day         naproxen 500 mg oral tablet: 1 tab(s) orally 2 times a day         Vitamin D3 125 mcg (5000 intl units) oral tablet: 1 tab(s) orally once  a day         Vitamin C 1000 mg oral tablet: 1 tab(s) orally once a day         fexofenadine 60 mg oral tablet: 1 tab(s) orally 2 times a day        Family History: No Family History items  are recorded  in the problem list.      Social History:   Social Substance History:  ·  Social History denies smoking, alcohol and drug use   ·  Smoking Status never smoker (1)   ·  Tobacco Use denies   ·  Alcohol Use denies   ·  Drug Use denies      Physical Exam:      Constitutional: Telltale signs of Down's syndrome,  awake/alert/oriented x3, no distress, alert and cooperative   Eyes: PERRL, EOMI, clear sclera   ENMT: mucous membranes moist, no apparent injury,  no lesions seen   Head/Neck: Neck supple, no apparent injury, thyroid  without mass or tenderness, No JVD, trachea midline, no bruits   Respiratory/Thorax: Patent airways, CTAB, normal  breath sounds with good chest expansion, thorax symmetric   Cardiovascular: Regular, rate and rhythm, no murmurs,  2+ equal pulses of the extremities, normal S 1and S 2   Gastrointestinal: Nondistended, soft, non-tender,  no rebound tenderness or guarding, no masses palpable, no organomegaly, +BS, no bruits   Genitourinary: No Discharge, vesicles or other abnormalities   Musculoskeletal: ROM intact, no joint swelling, normal  strength   Extremities: normal extremities, no cyanosis edema,  contusions or wounds, no clubbing   Neurological: alert and oriented x3, intact senses,  motor, response and reflexes, normal strength   Breast: No masses, tenderness, no discharge or discoloration   Lymphatic: No significant lymphadenopathy   Psychological: Appropriate mood and behavior   Skin: Warm and dry, no lesions, no rashes         Lab Results:           Assessment and Plan:          The patient is a 48-year-old man with past medical history of leukopenia, fluctuating thrombocytopenia, intentional weight loss.  At his heaviest the patient was 226 pounds from there he has slowly come down to 153 pounds over last 3 years.  The patient  did have his teeth pulled and now wears dentures.  However he states he manipulates his dentures often and does not like eating with them.  Reports  his caloric intake has decreased.  He reports good energy levels.  The patient likes to go bowling when  in league and enjoys going dancing.  The patient has a routine which she follows daily does not deviate from it.  His parents have noticed loss in abdominal girth.  He was evaluated 1 year earlier with CT scan of abdomen and pelvis no obvious etiology  was established for weight loss other than poor dentition and poor caloric intake.  The patient has a history of rheumatoid arthritis with positive CALEB 1: 320 has been treated with hydroxychloroquine dating back to June 2019.  The patient was referred  for further evaluation and management.      Physical examination revealed telltale signs of Down syndrome and increased abdominal girth but no other abnormality.  The patient does have mild leukopenia without untoward manifestation of recurrent localized or systemic infections.  Could be constitutional  versus drug effect such as hydroxychloroquine.  Hemoglobin and platelets in reference range.  Extensively evaluated in the past with iron indicis B12 folate TSH all in reference range.      The patient and his mother  had called for follow-up on August 16, 2023 regarding history of abnormal unintentional weight loss.  The patient extensively evaluated in the past.  CT scan of abdomen pelvis did not reveal any obvious etiology to explain weight  loss.  We have noticed TSH is 0.04 and may need readjustment of levothyroxine.  Also, we have noticed hemoglobin at 17.8 g/dL.  Will recommend MPN studies before next evaluation in 6 months.  No therapeutic recommendations.     Unintentional weight loss:      Extensively evaluated as well.  CT scan of the abdomen and pelvis in the past did not reveal any obvious etiology to explain the weight loss.  I had a detailed discussion with the patient explained to them that there is no obvious sign on examination.   However, it would be prudent to obtain a CT scan of chest abdomen  pelvis to rule out any recent etiology to explain weight loss.  The patient to follow more closely at [facility] facility.     The patient and family had come for follow-up on July 1, 2024 regarding history of thrombocytopenia and unintentional weight loss.  Apparently the patient had bad dentition in acquired a new set of dentures not fitting well does culminating in poor oral intake and weight loss.  The patient is beginning to eat well and the weight is stabilized around 137 pounds.  Denies history of recurrent localized or systemic bleeding.  CBC on May 6, 2024 revealed hemoglobin 15.6 g/dL platelets 1 24,000/mm³ WBC 3.1.  Iron indicis B12 folate TSH all in reference range suggesting most likely secondary to underlying disease versus medication such as hydroxychloroquine.  Patient to be followed clinically.     Hypercholesterolemia:      Atorvastatin 20 mg p.o. daily.      Hypothyroidism:      Levothyroxine 100 mcg p.o. daily.      Rheumatoid arthritis:      Hydroxychloroquine 200 mg p.o. daily.

## 2024-07-22 ENCOUNTER — TELEPHONE (OUTPATIENT)
Dept: PRIMARY CARE | Facility: CLINIC | Age: 49
End: 2024-07-22
Payer: MEDICARE

## 2024-07-29 NOTE — TELEPHONE ENCOUNTER
I called navin's mother and she stated that the referral is to get bars installed in shower.  he has to be evaluated to see if it is safe to for step into a tub or if he needs assistance but the insurance company said that he has to be seen by a physical therapist.  Navin's mother would also like to get recommendations for a physical therapist close by or would they be allowed [facility]?

## 2024-08-19 NOTE — TELEPHONE ENCOUNTER
Spoke to rehab in [place], Helen did this type evaluation for another patient. Staff will have her review the chart and let us know tomorrow. Father notified.

## 2024-08-26 DIAGNOSIS — Q90.9 DOWN'S SYNDROME (HHS-HCC): Primary | ICD-10-CM

## 2024-08-26 DIAGNOSIS — M05.7A RHEUMATOID ARTHRITIS OF OTHER SITE WITH POSITIVE RHEUMATOID FACTOR (MULTI): ICD-10-CM

## 2024-08-26 NOTE — TELEPHONE ENCOUNTER
Spoke to Helen Woods office at [facility]. She will do the out patient PT evaluation. Please place order. Thanks

## 2024-09-05 ENCOUNTER — LAB (OUTPATIENT)
Dept: LAB | Facility: LAB | Age: 49
End: 2024-09-05
Payer: COMMERCIAL

## 2024-09-05 ENCOUNTER — APPOINTMENT (OUTPATIENT)
Dept: PRIMARY CARE | Facility: CLINIC | Age: 49
End: 2024-09-05
Payer: MEDICARE

## 2024-09-05 VITALS
BODY MASS INDEX: 28.51 KG/M2 | SYSTOLIC BLOOD PRESSURE: 100 MMHG | TEMPERATURE: 97 F | WEIGHT: 146 LBS | HEART RATE: 69 BPM | OXYGEN SATURATION: 98 % | RESPIRATION RATE: 15 BRPM | DIASTOLIC BLOOD PRESSURE: 60 MMHG

## 2024-09-05 DIAGNOSIS — I10 BENIGN ESSENTIAL HYPERTENSION: ICD-10-CM

## 2024-09-05 DIAGNOSIS — R73.01 IMPAIRED FASTING GLUCOSE: ICD-10-CM

## 2024-09-05 DIAGNOSIS — D69.6 THROMBOCYTOPENIA (CMS-HCC): ICD-10-CM

## 2024-09-05 DIAGNOSIS — Q90.9 DOWN'S SYNDROME (HHS-HCC): Primary | ICD-10-CM

## 2024-09-05 DIAGNOSIS — E87.6 HYPOKALEMIA: ICD-10-CM

## 2024-09-05 DIAGNOSIS — E53.8 VITAMIN B12 DEFICIENCY: ICD-10-CM

## 2024-09-05 DIAGNOSIS — R63.4 WEIGHT LOSS, UNINTENTIONAL: ICD-10-CM

## 2024-09-05 DIAGNOSIS — D72.819 LEUKOPENIA, UNSPECIFIED TYPE: ICD-10-CM

## 2024-09-05 DIAGNOSIS — R76.8 ANA POSITIVE: ICD-10-CM

## 2024-09-05 DIAGNOSIS — E03.8 OTHER SPECIFIED HYPOTHYROIDISM: ICD-10-CM

## 2024-09-05 DIAGNOSIS — E55.9 VITAMIN D DEFICIENCY: ICD-10-CM

## 2024-09-05 DIAGNOSIS — E78.2 MIXED HYPERLIPIDEMIA: ICD-10-CM

## 2024-09-05 DIAGNOSIS — E03.9 HYPOTHYROIDISM, UNSPECIFIED TYPE: ICD-10-CM

## 2024-09-05 DIAGNOSIS — Z12.5 SCREENING FOR PROSTATE CANCER: ICD-10-CM

## 2024-09-05 DIAGNOSIS — M05.79 RHEUMATOID ARTHRITIS INVOLVING MULTIPLE SITES WITH POSITIVE RHEUMATOID FACTOR (MULTI): ICD-10-CM

## 2024-09-05 LAB
25(OH)D3 SERPL-MCNC: 75 NG/ML (ref 30–100)
ALBUMIN SERPL BCP-MCNC: 3.7 G/DL (ref 3.4–5)
ALP SERPL-CCNC: 84 U/L (ref 33–120)
ALT SERPL W P-5'-P-CCNC: 19 U/L (ref 10–52)
ANION GAP SERPL CALC-SCNC: 8 MMOL/L (ref 10–20)
AST SERPL W P-5'-P-CCNC: 19 U/L (ref 9–39)
BILIRUB SERPL-MCNC: 1.8 MG/DL (ref 0–1.2)
BUN SERPL-MCNC: 11 MG/DL (ref 6–23)
C3 SERPL-MCNC: 94 MG/DL (ref 87–200)
C4 SERPL-MCNC: 13 MG/DL (ref 10–50)
CALCIUM SERPL-MCNC: 8.5 MG/DL (ref 8.6–10.3)
CHLORIDE SERPL-SCNC: 104 MMOL/L (ref 98–107)
CHOLEST SERPL-MCNC: 177 MG/DL (ref 0–199)
CHOLESTEROL/HDL RATIO: 4.1
CO2 SERPL-SCNC: 31 MMOL/L (ref 21–32)
CREAT SERPL-MCNC: 0.73 MG/DL (ref 0.5–1.3)
CRP SERPL-MCNC: 0.18 MG/DL
DSDNA AB SER-ACNC: 1 IU/ML
EGFRCR SERPLBLD CKD-EPI 2021: >90 ML/MIN/1.73M*2
ERYTHROCYTE [DISTWIDTH] IN BLOOD BY AUTOMATED COUNT: 13.4 % (ref 11.5–14.5)
ERYTHROCYTE [SEDIMENTATION RATE] IN BLOOD BY WESTERGREN METHOD: 6 MM/H (ref 0–15)
EST. AVERAGE GLUCOSE BLD GHB EST-MCNC: 111 MG/DL
GLUCOSE SERPL-MCNC: 66 MG/DL (ref 74–99)
HBA1C MFR BLD: 5.5 %
HCT VFR BLD AUTO: 46.1 % (ref 41–52)
HDLC SERPL-MCNC: 43.2 MG/DL
HGB BLD-MCNC: 16 G/DL (ref 13.5–17.5)
LDLC SERPL CALC-MCNC: 118 MG/DL
MCH RBC QN AUTO: 34.9 PG (ref 26–34)
MCHC RBC AUTO-ENTMCNC: 34.7 G/DL (ref 32–36)
MCV RBC AUTO: 100 FL (ref 80–100)
NON HDL CHOLESTEROL: 134 MG/DL (ref 0–149)
NRBC BLD-RTO: 0 /100 WBCS (ref 0–0)
PLATELET # BLD AUTO: 141 X10*3/UL (ref 150–450)
POTASSIUM SERPL-SCNC: 4 MMOL/L (ref 3.5–5.3)
PROT SERPL-MCNC: 6.3 G/DL (ref 6.4–8.2)
PSA SERPL-MCNC: 0.16 NG/ML
RBC # BLD AUTO: 4.59 X10*6/UL (ref 4.5–5.9)
SODIUM SERPL-SCNC: 139 MMOL/L (ref 136–145)
TRIGL SERPL-MCNC: 78 MG/DL (ref 0–149)
TSH SERPL-ACNC: 2.26 MIU/L (ref 0.44–3.98)
VIT B12 SERPL-MCNC: 474 PG/ML (ref 211–911)
VLDL: 16 MG/DL (ref 0–40)
WBC # BLD AUTO: 4.3 X10*3/UL (ref 4.4–11.3)

## 2024-09-05 PROCEDURE — 86225 DNA ANTIBODY NATIVE: CPT

## 2024-09-05 PROCEDURE — G0103 PSA SCREENING: HCPCS

## 2024-09-05 PROCEDURE — 82306 VITAMIN D 25 HYDROXY: CPT

## 2024-09-05 PROCEDURE — 84443 ASSAY THYROID STIM HORMONE: CPT

## 2024-09-05 PROCEDURE — 1036F TOBACCO NON-USER: CPT | Performed by: FAMILY MEDICINE

## 2024-09-05 PROCEDURE — 80053 COMPREHEN METABOLIC PANEL: CPT

## 2024-09-05 PROCEDURE — 86160 COMPLEMENT ANTIGEN: CPT

## 2024-09-05 PROCEDURE — 80061 LIPID PANEL: CPT

## 2024-09-05 PROCEDURE — 82607 VITAMIN B-12: CPT

## 2024-09-05 PROCEDURE — 86140 C-REACTIVE PROTEIN: CPT

## 2024-09-05 PROCEDURE — 3078F DIAST BP <80 MM HG: CPT | Performed by: FAMILY MEDICINE

## 2024-09-05 PROCEDURE — 85027 COMPLETE CBC AUTOMATED: CPT

## 2024-09-05 PROCEDURE — 3074F SYST BP LT 130 MM HG: CPT | Performed by: FAMILY MEDICINE

## 2024-09-05 PROCEDURE — 99214 OFFICE O/P EST MOD 30 MIN: CPT | Performed by: FAMILY MEDICINE

## 2024-09-05 PROCEDURE — 85652 RBC SED RATE AUTOMATED: CPT

## 2024-09-05 PROCEDURE — 83036 HEMOGLOBIN GLYCOSYLATED A1C: CPT

## 2024-09-05 RX ORDER — ISOSORBIDE MONONITRATE 10 MG/1
10 TABLET ORAL DAILY
Qty: 90 TABLET | Refills: 1 | Status: SHIPPED | OUTPATIENT
Start: 2024-09-05 | End: 2025-09-05

## 2024-09-05 RX ORDER — LEVOTHYROXINE SODIUM 75 UG/1
TABLET ORAL
Qty: 90 TABLET | Refills: 1 | Status: SHIPPED | OUTPATIENT
Start: 2024-09-05

## 2024-09-05 RX ORDER — POTASSIUM CHLORIDE 1.5 G/1.58G
20 POWDER, FOR SOLUTION ORAL DAILY
Qty: 90 PACKET | Refills: 1 | Status: SHIPPED | OUTPATIENT
Start: 2024-09-05 | End: 2025-09-05

## 2024-09-05 RX ORDER — ATORVASTATIN CALCIUM 20 MG/1
20 TABLET, FILM COATED ORAL NIGHTLY
Qty: 90 TABLET | Refills: 1 | Status: SHIPPED | OUTPATIENT
Start: 2024-09-05 | End: 2025-09-05

## 2024-09-05 RX ORDER — FUROSEMIDE 40 MG/1
40 TABLET ORAL DAILY
Qty: 90 TABLET | Refills: 1 | Status: SHIPPED | OUTPATIENT
Start: 2024-09-05 | End: 2025-09-05

## 2024-09-05 ASSESSMENT — ENCOUNTER SYMPTOMS
PALPITATIONS: 0
CONFUSION: 0
ABDOMINAL PAIN: 0
CHEST TIGHTNESS: 0
ARTHRALGIAS: 0
SHORTNESS OF BREATH: 0
FEVER: 0
CHILLS: 0

## 2024-09-05 NOTE — ASSESSMENT & PLAN NOTE
Patient is actually gained about 8 to 10 pounds.  Now has a new dentures which fit properly and he has been doing a much better job at eating.  Upon review of his weight over the past year he is essentially been stable within 1 pound of where he was a year ago at this time.  We will continue to monitor.  Goal is not necessarily for him to gain all of his weight back as he actually was overweight previously.  Hoping that he will maintain present weight.

## 2024-09-05 NOTE — PROGRESS NOTES
Subjective   Patient ID: Navin Johnston is a 49 y.o. male who presents for Follow-up (4 month).    HPI patient today with parents for follow-up of ongoing healthcare issues and review of his case.  Overall has been doing better finally has dentures that fit and he is able to use in her functional as result has been eating much better over the past month or 2.  Saw hematology over the summer they felt things were stable and continue to follow.  Patient have lab work done this week and call for results next week.    Review of Systems   Constitutional:  Negative for chills and fever.   HENT:  Negative for congestion and ear pain.    Eyes:  Negative for visual disturbance.   Respiratory:  Negative for chest tightness and shortness of breath.    Cardiovascular:  Negative for chest pain and palpitations.   Gastrointestinal:  Negative for abdominal pain.   Musculoskeletal:  Negative for arthralgias.   Skin:  Negative for pallor.   Psychiatric/Behavioral:  Negative for confusion.        Objective   /60   Pulse 69   Temp 36.1 °C (97 °F)   Resp 15   Wt 66.2 kg (146 lb)   SpO2 98%   BMI 28.51 kg/m²     Physical Exam  Vitals and nursing note reviewed.   Constitutional:       General: He is not in acute distress.     Appearance: Normal appearance. He is not ill-appearing.   HENT:      Head: Normocephalic and atraumatic.      Right Ear: Tympanic membrane, ear canal and external ear normal.      Left Ear: Tympanic membrane, ear canal and external ear normal.      Mouth/Throat:      Pharynx: Oropharynx is clear.   Eyes:      Extraocular Movements: Extraocular movements intact.   Cardiovascular:      Rate and Rhythm: Normal rate and regular rhythm.      Pulses: Normal pulses.      Heart sounds: Normal heart sounds.   Pulmonary:      Effort: Pulmonary effort is normal.      Breath sounds: Normal breath sounds.   Abdominal:      General: Abdomen is flat. Bowel sounds are normal.      Palpations: Abdomen is soft.       Tenderness: There is no abdominal tenderness.   Musculoskeletal:         General: Normal range of motion.      Cervical back: Neck supple.   Skin:     General: Skin is warm.   Neurological:      Mental Status: He is alert and oriented to person, place, and time. Mental status is at baseline.   Psychiatric:         Mood and Affect: Mood normal.     Get fasting lab work completed call for results    We discussed dietary modification with regards to carb intake    Patient parents refuse immunizations    Necessary refills provided    Return to our office December with repeat lab work    Assessment/Plan   Problem List Items Addressed This Visit             ICD-10-CM    Down's syndrome ([facility]) - Primary Q90.9     Clinically stable no acute changes         Hyperlipidemia E78.5     Lipitor 20 mg nightly         Relevant Medications    atorvastatin (Lipitor) 20 mg tablet    Other Relevant Orders    Follow Up In Primary Care - Established    Comprehensive Metabolic Panel    Lipid Panel    Hypothyroidism E03.9     Check TSH for now continue levothyroxine         Relevant Medications    levothyroxine (Synthroid, Levoxyl) 75 mcg tablet    Other Relevant Orders    Follow Up In Primary Care - Established    Comprehensive Metabolic Panel    TSH with reflex to Free T4 if abnormal    Leukopenia D72.819     Improved following with hematology         Relevant Orders    Follow Up In Primary Care - Established    CBC    Impaired fasting glucose R73.01     A1c is improved with weight loss we will continue to monitor         Relevant Orders    Follow Up In Primary Care - Established    Comprehensive Metabolic Panel    Hemoglobin A1C    Thrombocytopenia (CMS-HCC) D69.6     Stable continue to monitor follow-up with hematology         Relevant Orders    Follow Up In Primary Care - Established    CBC    Vitamin B12 deficiency E53.8     Continue to monitor and supplement         Relevant Orders    Vitamin B12    Vitamin D deficiency E55.9      Continue to monitor and supplement         Relevant Orders    Vitamin D 25-Hydroxy,Total (for eval of Vitamin D levels)    Weight loss, unintentional R63.4     Patient is actually gained about 8 to 10 pounds.  Now has a new dentures which fit properly and he has been doing a much better job at eating.  Upon review of his weight over the past year he is essentially been stable within 1 pound of where he was a year ago at this time.  We will continue to monitor.  Goal is not necessarily for him to gain all of his weight back as he actually was overweight previously.  Hoping that he will maintain present weight.         Benign essential hypertension I10     Stable continue to monitor.         Relevant Medications    furosemide (Lasix) 40 mg tablet    isosorbide mononitrate 10 mg tablet    Other Relevant Orders    Follow Up In Primary Care - Established    CBC    Comprehensive Metabolic Panel    Hypokalemia E87.6     Stable continue potassium supplementation and monitor         Relevant Medications    potassium chloride (Klor-Con) 20 mEq packet

## 2024-10-04 ENCOUNTER — EVALUATION (OUTPATIENT)
Dept: PHYSICAL THERAPY | Facility: CLINIC | Age: 49
End: 2024-10-04
Payer: MEDICARE

## 2024-10-04 DIAGNOSIS — M05.7A RHEUMATOID ARTHRITIS OF OTHER SITE WITH POSITIVE RHEUMATOID FACTOR: ICD-10-CM

## 2024-10-04 DIAGNOSIS — Q90.9 DOWN'S SYNDROME (HHS-HCC): ICD-10-CM

## 2024-10-04 PROCEDURE — 97162 PT EVAL MOD COMPLEX 30 MIN: CPT | Mod: GP

## 2024-10-04 NOTE — PROGRESS NOTES
Physical Therapy Evaluation    Subjective:  Patient Name: Navin Johnston  MRN: [id number]  Today's Date: 10/6/2024  Visit: 1/MN  Referred by: Fredy Wilson  Time Calculation  Start Time: 1130  Stop Time: 1215  Time Calculation (min): 45 min  Diagnosis: Down' Syndrome  Mechanism of Injury:  Patient arrived today for mobility evaluation, assisted by his parents.  Parents reports patient has RA which is affecting multiple joints.  Also with circulation issues in LEs  Currently has tub shower with grab bar.  Uses to shower only, not bathe in tub.  Has handle on L side (outside shower) to enter shower currently.  Can bathe indep thus far but with parents nearby for safety.  Difficulty stepping over edge of tub to enter/exit  No falls in bathroom, but does have occasional falls outside.    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  Living Environment: 1 story home.  3 step entry (lower rise with bilat rails)  Social Support: parents.  Cleso Jean-Baptiste assists family in establishing IEP annually  Personal Factors that may impact care: cognition, LE circulation    Objective:    Gait: Amb indep without assistive device, slow gait speed  Stairs: able to ascend/descend stairs reciprocally with bilat handrails, significantly slowed velocity with descending.  Decreased sensation to light touch over R medial foot and L lateral foot to light touch.  Has significant decreased circulation in bilat lower legs with discoloration.    LE Strength (R/L):  Hip flexion= 3-/3-  Knee extension= 5/4-  Ankle DF= 5/4    LE ROM/Flexibility (R/L):  Knee flexion= WNL  Knee extension=WNL  Hip flexion= WNL  Ankle DF= mild tightness bilat    Stepping over 6 inch bolster laterally (R and L) = needs bilat UE assist, difficulty stepping far enough  apart to accommodate other foot     Stepping over 9 inch bolster fwd= requires UE support, slow speed    Treatment Performed Today:  No treatment needed at this time.      Assessment:  50 yo M with dx of Down's Syndrome who presents for mobility evaluation for modification of shower.  Would benefit from walk in shower due to multi-joint pain as well as balance.     . Moderate complexity due to patient's clinical presentation being evolving with changing characteristics, with comorbidities/complexities to include Down's Syndrome, all of which may negatively impact rehab tolerance and progression.  Clinical presentation:  Evolving with changing characteristics,    Problem List:  Pain, LE weakness, decreased cognition  Level of Complexity:  moderate  Plan:  -Goals:  Active       PT Problem       Assist patient with documentation needed to obtain needed shower modifications.       Start:  10/06/24    Expected End:  01/04/25              -Frequency & Duration:   NA  -Rehab Potential:   NA  Plan of care was developed with input and agreement of the patient.    Billing:  PT Evaluation Time Entry  PT Evaluation (Moderate) Time Entry: 45

## 2024-10-06 ASSESSMENT — ENCOUNTER SYMPTOMS
DEPRESSION: 0
OCCASIONAL FEELINGS OF UNSTEADINESS: 0
LOSS OF SENSATION IN FEET: 1

## 2024-11-20 DIAGNOSIS — M05.79 RHEUMATOID ARTHRITIS INVOLVING MULTIPLE SITES WITH POSITIVE RHEUMATOID FACTOR (MULTI): ICD-10-CM

## 2024-11-20 RX ORDER — HYDROXYCHLOROQUINE SULFATE 200 MG/1
300 TABLET, FILM COATED ORAL DAILY
Qty: 135 TABLET | Refills: 1 | Status: SHIPPED | OUTPATIENT
Start: 2024-11-20 | End: 2025-02-18

## 2024-11-20 NOTE — TELEPHONE ENCOUNTER
Patient is going out of town to [place] with family, and want be back till he end of may, patient has appointment set up for when he comes back asking if he can get enough medic ton till then

## 2024-11-21 ENCOUNTER — APPOINTMENT (OUTPATIENT)
Dept: RHEUMATOLOGY | Facility: CLINIC | Age: 49
End: 2024-11-21
Payer: MEDICARE

## 2024-12-10 ENCOUNTER — APPOINTMENT (OUTPATIENT)
Dept: PRIMARY CARE | Facility: CLINIC | Age: 49
End: 2024-12-10
Payer: COMMERCIAL

## 2024-12-10 VITALS
RESPIRATION RATE: 15 BRPM | TEMPERATURE: 97.5 F | WEIGHT: 147 LBS | OXYGEN SATURATION: 97 % | SYSTOLIC BLOOD PRESSURE: 100 MMHG | BODY MASS INDEX: 28.71 KG/M2 | HEART RATE: 65 BPM | DIASTOLIC BLOOD PRESSURE: 62 MMHG

## 2024-12-10 DIAGNOSIS — E53.8 VITAMIN B12 DEFICIENCY: ICD-10-CM

## 2024-12-10 DIAGNOSIS — D72.819 LEUKOPENIA, UNSPECIFIED TYPE: ICD-10-CM

## 2024-12-10 DIAGNOSIS — D69.6 THROMBOCYTOPENIA (CMS-HCC): ICD-10-CM

## 2024-12-10 DIAGNOSIS — E03.9 HYPOTHYROIDISM, UNSPECIFIED TYPE: ICD-10-CM

## 2024-12-10 DIAGNOSIS — R73.01 IMPAIRED FASTING GLUCOSE: ICD-10-CM

## 2024-12-10 DIAGNOSIS — E87.6 HYPOKALEMIA: ICD-10-CM

## 2024-12-10 DIAGNOSIS — I10 BENIGN ESSENTIAL HYPERTENSION: Primary | ICD-10-CM

## 2024-12-10 DIAGNOSIS — E78.2 MIXED HYPERLIPIDEMIA: ICD-10-CM

## 2024-12-10 DIAGNOSIS — E03.8 OTHER SPECIFIED HYPOTHYROIDISM: ICD-10-CM

## 2024-12-10 DIAGNOSIS — E55.9 VITAMIN D DEFICIENCY: ICD-10-CM

## 2024-12-10 PROCEDURE — 3074F SYST BP LT 130 MM HG: CPT | Performed by: FAMILY MEDICINE

## 2024-12-10 PROCEDURE — 99214 OFFICE O/P EST MOD 30 MIN: CPT | Performed by: FAMILY MEDICINE

## 2024-12-10 PROCEDURE — G2211 COMPLEX E/M VISIT ADD ON: HCPCS | Performed by: FAMILY MEDICINE

## 2024-12-10 PROCEDURE — 1036F TOBACCO NON-USER: CPT | Performed by: FAMILY MEDICINE

## 2024-12-10 PROCEDURE — 3078F DIAST BP <80 MM HG: CPT | Performed by: FAMILY MEDICINE

## 2024-12-10 RX ORDER — ISOSORBIDE MONONITRATE 10 MG/1
10 TABLET ORAL DAILY
Qty: 90 TABLET | Refills: 1 | Status: SHIPPED | OUTPATIENT
Start: 2024-12-10 | End: 2025-12-10

## 2024-12-10 RX ORDER — ATORVASTATIN CALCIUM 20 MG/1
20 TABLET, FILM COATED ORAL NIGHTLY
Qty: 90 TABLET | Refills: 1 | Status: SHIPPED | OUTPATIENT
Start: 2024-12-10 | End: 2025-12-10

## 2024-12-10 RX ORDER — FUROSEMIDE 40 MG/1
40 TABLET ORAL DAILY
Qty: 90 TABLET | Refills: 1 | Status: SHIPPED | OUTPATIENT
Start: 2024-12-10 | End: 2025-12-10

## 2024-12-10 RX ORDER — LEVOTHYROXINE SODIUM 75 UG/1
TABLET ORAL
Qty: 90 TABLET | Refills: 1 | Status: SHIPPED | OUTPATIENT
Start: 2024-12-10 | End: 2024-12-10

## 2024-12-10 RX ORDER — POTASSIUM CHLORIDE 1.5 G/1.58G
20 POWDER, FOR SOLUTION ORAL DAILY
Qty: 90 PACKET | Refills: 1 | Status: SHIPPED | OUTPATIENT
Start: 2024-12-10 | End: 2025-12-10

## 2024-12-10 RX ORDER — LEVOTHYROXINE SODIUM 75 UG/1
TABLET ORAL
Qty: 90 TABLET | Refills: 1 | Status: SHIPPED | OUTPATIENT
Start: 2024-12-10

## 2024-12-10 ASSESSMENT — ENCOUNTER SYMPTOMS
CHEST TIGHTNESS: 0
FEVER: 0
CONFUSION: 0
ABDOMINAL PAIN: 0
CHILLS: 0
ARTHRALGIAS: 0
SHORTNESS OF BREATH: 0
PALPITATIONS: 0

## 2024-12-10 NOTE — PROGRESS NOTES
Subjective   Patient ID: Navin Johnston is a 49 y.o. male who presents for Follow-up (3 month).    HPI   Today with mom for follow-up overalls been doing really good.  Denies any major new acute complaints mom states patient is can to be going which she and her  down to [place] for the winter will be back till the spring time.  Says she will get blood work done before Jose and call for results.  Review of Systems   Constitutional:  Negative for chills and fever.   HENT:  Positive for hearing loss. Negative for congestion and ear pain.         Wears hearing aid   Eyes:  Negative for visual disturbance.   Respiratory:  Negative for chest tightness and shortness of breath.    Cardiovascular:  Negative for chest pain and palpitations.   Gastrointestinal:  Negative for abdominal pain.   Musculoskeletal:  Negative for arthralgias.   Skin:  Negative for pallor.   Psychiatric/Behavioral:  Negative for confusion.        Objective   /62   Pulse 65   Temp 36.4 °C (97.5 °F)   Resp 15   Wt 66.7 kg (147 lb)   SpO2 97%   BMI 28.71 kg/m²     Physical Exam  Vitals and nursing note reviewed.   Constitutional:       General: He is not in acute distress.     Appearance: He is not ill-appearing.      Comments: Down syndrome appearance   HENT:      Head: Normocephalic and atraumatic.      Right Ear: Tympanic membrane, ear canal and external ear normal.      Left Ear: Tympanic membrane, ear canal and external ear normal.      Mouth/Throat:      Pharynx: Oropharynx is clear.   Eyes:      Extraocular Movements: Extraocular movements intact.   Cardiovascular:      Rate and Rhythm: Normal rate and regular rhythm.      Pulses: Normal pulses.      Heart sounds: Normal heart sounds.   Pulmonary:      Effort: Pulmonary effort is normal.      Breath sounds: Normal breath sounds.   Abdominal:      General: Abdomen is flat. Bowel sounds are normal.      Palpations: Abdomen is soft.      Tenderness: There is no abdominal  tenderness.   Musculoskeletal:         General: Normal range of motion.      Cervical back: Neck supple.   Skin:     General: Skin is warm.   Neurological:      Mental Status: He is alert and oriented to person, place, and time. Mental status is at baseline.   Psychiatric:         Mood and Affect: Mood normal.     Fasting lab work done this month call for results.  Necessary refills provided.    Refuses flu vaccine.  Return to office in May 2025 with repeat fasting labs      Assessment/Plan   Problem List Items Addressed This Visit             ICD-10-CM    Hyperlipidemia E78.5     Check fasting lipid continue atorvastatin 20 mg daily         Relevant Medications    atorvastatin (Lipitor) 20 mg tablet    Other Relevant Orders    Follow Up In Primary Care - Established    Comprehensive Metabolic Panel    Lipid Panel    Hypothyroidism E03.9     Levothyroxine 75 mcg daily check TSH with reflex         Relevant Medications    levothyroxine (Synthroid, Levoxyl) 75 mcg tablet    Other Relevant Orders    Follow Up In Primary Care - Established    TSH with reflex to Free T4 if abnormal    Leukopenia D72.819     Continue to monitor CBC         Relevant Orders    Follow Up In Primary Care - Established    CBC    Impaired fasting glucose R73.01     Blood sugar has improved and has been doing much better since the weight loss continue to monitor         Relevant Orders    Follow Up In Primary Care - Established    Comprehensive Metabolic Panel    Hemoglobin A1C    Thrombocytopenia ([facility]) D69.6     No unusual bleeding or bruising continue to monitor CBC         Relevant Orders    Follow Up In Primary Care - Established    CBC    Vitamin B12 deficiency E53.8     Monitor supplement as needed         Relevant Orders    Vitamin B12    Vitamin D deficiency E55.9     Monitor supplement as needed         Relevant Orders    Vitamin D 25-Hydroxy,Total (for eval of Vitamin D levels)    Benign essential hypertension - Primary I10     BP  low normal but stable continue current medication and monitor.         Relevant Medications    furosemide (Lasix) 40 mg tablet    isosorbide mononitrate 10 mg tablet    Other Relevant Orders    Follow Up In Primary Care - Established    Comprehensive Metabolic Panel    Hypokalemia E87.6     Continue to monitor and supplement         Relevant Medications    potassium chloride (Klor-Con) 20 mEq packet

## 2024-12-16 ENCOUNTER — TELEPHONE (OUTPATIENT)
Dept: RHEUMATOLOGY | Facility: CLINIC | Age: 49
End: 2024-12-16

## 2024-12-16 ENCOUNTER — LAB (OUTPATIENT)
Dept: LAB | Facility: LAB | Age: 49
End: 2024-12-16
Payer: MEDICARE

## 2024-12-16 DIAGNOSIS — E53.8 VITAMIN B12 DEFICIENCY: ICD-10-CM

## 2024-12-16 DIAGNOSIS — D69.6 THROMBOCYTOPENIA (CMS-HCC): ICD-10-CM

## 2024-12-16 DIAGNOSIS — R73.01 IMPAIRED FASTING GLUCOSE: ICD-10-CM

## 2024-12-16 DIAGNOSIS — D72.819 LEUKOPENIA, UNSPECIFIED TYPE: ICD-10-CM

## 2024-12-16 DIAGNOSIS — E78.2 MIXED HYPERLIPIDEMIA: ICD-10-CM

## 2024-12-16 DIAGNOSIS — E03.8 OTHER SPECIFIED HYPOTHYROIDISM: ICD-10-CM

## 2024-12-16 DIAGNOSIS — E55.9 VITAMIN D DEFICIENCY: ICD-10-CM

## 2024-12-16 DIAGNOSIS — I10 BENIGN ESSENTIAL HYPERTENSION: ICD-10-CM

## 2024-12-16 DIAGNOSIS — M05.79 RHEUMATOID ARTHRITIS INVOLVING MULTIPLE SITES WITH POSITIVE RHEUMATOID FACTOR (MULTI): ICD-10-CM

## 2024-12-16 LAB
25(OH)D3 SERPL-MCNC: 51 NG/ML (ref 30–100)
ALBUMIN SERPL BCP-MCNC: 3.6 G/DL (ref 3.4–5)
ALP SERPL-CCNC: 82 U/L (ref 33–120)
ALT SERPL W P-5'-P-CCNC: 14 U/L (ref 10–52)
ANION GAP SERPL CALC-SCNC: 11 MMOL/L (ref 10–20)
AST SERPL W P-5'-P-CCNC: 17 U/L (ref 9–39)
BILIRUB SERPL-MCNC: 1.8 MG/DL (ref 0–1.2)
BUN SERPL-MCNC: 12 MG/DL (ref 6–23)
CALCIUM SERPL-MCNC: 8.6 MG/DL (ref 8.6–10.3)
CHLORIDE SERPL-SCNC: 101 MMOL/L (ref 98–107)
CHOLEST SERPL-MCNC: 147 MG/DL (ref 0–199)
CHOLESTEROL/HDL RATIO: 3.1
CO2 SERPL-SCNC: 32 MMOL/L (ref 21–32)
CREAT SERPL-MCNC: 0.66 MG/DL (ref 0.5–1.3)
EGFRCR SERPLBLD CKD-EPI 2021: >90 ML/MIN/1.73M*2
ERYTHROCYTE [DISTWIDTH] IN BLOOD BY AUTOMATED COUNT: 12.9 % (ref 11.5–14.5)
EST. AVERAGE GLUCOSE BLD GHB EST-MCNC: 94 MG/DL
GLUCOSE SERPL-MCNC: 80 MG/DL (ref 74–99)
HBA1C MFR BLD: 4.9 %
HCT VFR BLD AUTO: 48.1 % (ref 41–52)
HDLC SERPL-MCNC: 47.9 MG/DL
HGB BLD-MCNC: 16.8 G/DL (ref 13.5–17.5)
LDLC SERPL CALC-MCNC: 85 MG/DL
MCH RBC QN AUTO: 34.5 PG (ref 26–34)
MCHC RBC AUTO-ENTMCNC: 34.9 G/DL (ref 32–36)
MCV RBC AUTO: 99 FL (ref 80–100)
NON HDL CHOLESTEROL: 99 MG/DL (ref 0–149)
NRBC BLD-RTO: 0 /100 WBCS (ref 0–0)
PLATELET # BLD AUTO: 125 X10*3/UL (ref 150–450)
POTASSIUM SERPL-SCNC: 4.1 MMOL/L (ref 3.5–5.3)
PROT SERPL-MCNC: 6.3 G/DL (ref 6.4–8.2)
RBC # BLD AUTO: 4.87 X10*6/UL (ref 4.5–5.9)
SODIUM SERPL-SCNC: 140 MMOL/L (ref 136–145)
TRIGL SERPL-MCNC: 70 MG/DL (ref 0–149)
TSH SERPL-ACNC: 2.68 MIU/L (ref 0.44–3.98)
VIT B12 SERPL-MCNC: 347 PG/ML (ref 211–911)
VLDL: 14 MG/DL (ref 0–40)
WBC # BLD AUTO: 3.3 X10*3/UL (ref 4.4–11.3)

## 2024-12-16 PROCEDURE — 80053 COMPREHEN METABOLIC PANEL: CPT

## 2024-12-16 PROCEDURE — 83036 HEMOGLOBIN GLYCOSYLATED A1C: CPT

## 2024-12-16 PROCEDURE — 84443 ASSAY THYROID STIM HORMONE: CPT

## 2024-12-16 PROCEDURE — 82306 VITAMIN D 25 HYDROXY: CPT

## 2024-12-16 PROCEDURE — 36415 COLL VENOUS BLD VENIPUNCTURE: CPT

## 2024-12-16 PROCEDURE — 82607 VITAMIN B-12: CPT

## 2024-12-16 PROCEDURE — 80061 LIPID PANEL: CPT

## 2024-12-16 PROCEDURE — 85027 COMPLETE CBC AUTOMATED: CPT

## 2025-02-19 ENCOUNTER — TELEPHONE (OUTPATIENT)
Dept: RHEUMATOLOGY | Facility: CLINIC | Age: 50
End: 2025-02-19
Payer: MEDICARE

## 2025-02-19 DIAGNOSIS — M05.79 RHEUMATOID ARTHRITIS INVOLVING MULTIPLE SITES WITH POSITIVE RHEUMATOID FACTOR (MULTI): Primary | ICD-10-CM

## 2025-02-19 RX ORDER — METHYLPREDNISOLONE 4 MG/1
TABLET ORAL
Qty: 21 TABLET | Refills: 0 | Status: SHIPPED | OUTPATIENT
Start: 2025-02-19 | End: 2025-02-26

## 2025-02-19 NOTE — TELEPHONE ENCOUNTER
Patient is having a flair up, hand swelling and knee swelling and pain right side    Asking for a steroid pack to help they are in [place] till April. And want to make sure he gets what he needs

## 2025-05-03 LAB
25(OH)D3+25(OH)D2 SERPL-MCNC: 43 NG/ML (ref 30–100)
ALBUMIN SERPL-MCNC: 3.8 G/DL (ref 3.6–5.1)
ALP SERPL-CCNC: 76 U/L (ref 35–144)
ALT SERPL-CCNC: 18 U/L (ref 9–46)
ANION GAP SERPL CALCULATED.4IONS-SCNC: 8 MMOL/L (CALC) (ref 7–17)
AST SERPL-CCNC: 21 U/L (ref 10–35)
BILIRUB SERPL-MCNC: 1.7 MG/DL (ref 0.2–1.2)
BUN SERPL-MCNC: 15 MG/DL (ref 7–25)
CALCIUM SERPL-MCNC: 8.8 MG/DL (ref 8.6–10.3)
CHLORIDE SERPL-SCNC: 106 MMOL/L (ref 98–110)
CHOLEST SERPL-MCNC: 126 MG/DL
CHOLEST/HDLC SERPL: 2.9 (CALC)
CO2 SERPL-SCNC: 27 MMOL/L (ref 20–32)
CREAT SERPL-MCNC: 0.71 MG/DL (ref 0.7–1.3)
EGFRCR SERPLBLD CKD-EPI 2021: 111 ML/MIN/1.73M2
ERYTHROCYTE [DISTWIDTH] IN BLOOD BY AUTOMATED COUNT: 13.4 % (ref 11–15)
EST. AVERAGE GLUCOSE BLD GHB EST-MCNC: 103 MG/DL
EST. AVERAGE GLUCOSE BLD GHB EST-SCNC: 5.7 MMOL/L
GLUCOSE SERPL-MCNC: 92 MG/DL (ref 65–99)
HBA1C MFR BLD: 5.2 %
HCT VFR BLD AUTO: 46.7 % (ref 38.5–50)
HDLC SERPL-MCNC: 43 MG/DL
HGB BLD-MCNC: 16 G/DL (ref 13.2–17.1)
LDLC SERPL CALC-MCNC: 69 MG/DL (CALC)
MCH RBC QN AUTO: 34.5 PG (ref 27–33)
MCHC RBC AUTO-ENTMCNC: 34.3 G/DL (ref 32–36)
MCV RBC AUTO: 100.6 FL (ref 80–100)
NONHDLC SERPL-MCNC: 83 MG/DL (CALC)
PLATELET # BLD AUTO: 112 THOUSAND/UL (ref 140–400)
PMV BLD REES-ECKER: 10.7 FL (ref 7.5–12.5)
POTASSIUM SERPL-SCNC: 4.1 MMOL/L (ref 3.5–5.3)
PROT SERPL-MCNC: 6.3 G/DL (ref 6.1–8.1)
RBC # BLD AUTO: 4.64 MILLION/UL (ref 4.2–5.8)
SODIUM SERPL-SCNC: 141 MMOL/L (ref 135–146)
TRIGL SERPL-MCNC: 59 MG/DL
TSH SERPL-ACNC: 2.83 MIU/L (ref 0.4–4.5)
VIT B12 SERPL-MCNC: 432 PG/ML (ref 200–1100)
WBC # BLD AUTO: 3.6 THOUSAND/UL (ref 3.8–10.8)

## 2025-05-08 ENCOUNTER — APPOINTMENT (OUTPATIENT)
Dept: RHEUMATOLOGY | Facility: CLINIC | Age: 50
End: 2025-05-08
Payer: MEDICARE

## 2025-05-10 DIAGNOSIS — M05.79 RHEUMATOID ARTHRITIS INVOLVING MULTIPLE SITES WITH POSITIVE RHEUMATOID FACTOR (MULTI): ICD-10-CM

## 2025-05-12 RX ORDER — HYDROXYCHLOROQUINE SULFATE 200 MG/1
300 TABLET, FILM COATED ORAL DAILY
Qty: 135 TABLET | Refills: 0 | Status: SHIPPED | OUTPATIENT
Start: 2025-05-12 | End: 2025-08-10

## 2025-05-20 ENCOUNTER — APPOINTMENT (OUTPATIENT)
Dept: PRIMARY CARE | Facility: CLINIC | Age: 50
End: 2025-05-20
Payer: MEDICARE

## 2025-05-20 ENCOUNTER — HOSPITAL ENCOUNTER (OUTPATIENT)
Dept: RADIOLOGY | Facility: CLINIC | Age: 50
Discharge: HOME | End: 2025-05-20
Payer: MEDICARE

## 2025-05-20 VITALS
BODY MASS INDEX: 29.53 KG/M2 | WEIGHT: 151.2 LBS | TEMPERATURE: 96.4 F | DIASTOLIC BLOOD PRESSURE: 60 MMHG | SYSTOLIC BLOOD PRESSURE: 100 MMHG | OXYGEN SATURATION: 96 % | HEART RATE: 72 BPM

## 2025-05-20 DIAGNOSIS — I27.20 PULMONARY HYPERTENSION, UNSPECIFIED (MULTI): ICD-10-CM

## 2025-05-20 DIAGNOSIS — Z12.5 SCREENING FOR PROSTATE CANCER: ICD-10-CM

## 2025-05-20 DIAGNOSIS — I10 BENIGN ESSENTIAL HYPERTENSION: ICD-10-CM

## 2025-05-20 DIAGNOSIS — E55.9 VITAMIN D DEFICIENCY: ICD-10-CM

## 2025-05-20 DIAGNOSIS — R73.01 IMPAIRED FASTING GLUCOSE: ICD-10-CM

## 2025-05-20 DIAGNOSIS — D72.819 LEUKOPENIA, UNSPECIFIED TYPE: ICD-10-CM

## 2025-05-20 DIAGNOSIS — J20.8 ACUTE BRONCHITIS DUE TO OTHER SPECIFIED ORGANISMS: ICD-10-CM

## 2025-05-20 DIAGNOSIS — Z00.00 MEDICARE ANNUAL WELLNESS VISIT, SUBSEQUENT: Primary | ICD-10-CM

## 2025-05-20 DIAGNOSIS — J01.81 OTHER ACUTE RECURRENT SINUSITIS: ICD-10-CM

## 2025-05-20 DIAGNOSIS — D69.6 THROMBOCYTOPENIA: ICD-10-CM

## 2025-05-20 DIAGNOSIS — E03.9 HYPOTHYROIDISM, UNSPECIFIED TYPE: ICD-10-CM

## 2025-05-20 DIAGNOSIS — Q90.9 DOWN'S SYNDROME (HHS-HCC): ICD-10-CM

## 2025-05-20 DIAGNOSIS — I27.20 PULMONARY HYPERTENSION (MULTI): ICD-10-CM

## 2025-05-20 DIAGNOSIS — E78.2 MIXED HYPERLIPIDEMIA: ICD-10-CM

## 2025-05-20 DIAGNOSIS — E53.8 VITAMIN B12 DEFICIENCY: ICD-10-CM

## 2025-05-20 PROBLEM — M05.9 RHEUMATOID ARTHRITIS WITH RHEUMATOID FACTOR: Status: RESOLVED | Noted: 2023-04-24 | Resolved: 2025-05-20

## 2025-05-20 PROCEDURE — 3078F DIAST BP <80 MM HG: CPT | Performed by: FAMILY MEDICINE

## 2025-05-20 PROCEDURE — G0439 PPPS, SUBSEQ VISIT: HCPCS | Performed by: FAMILY MEDICINE

## 2025-05-20 PROCEDURE — 71046 X-RAY EXAM CHEST 2 VIEWS: CPT | Performed by: STUDENT IN AN ORGANIZED HEALTH CARE EDUCATION/TRAINING PROGRAM

## 2025-05-20 PROCEDURE — 3074F SYST BP LT 130 MM HG: CPT | Performed by: FAMILY MEDICINE

## 2025-05-20 PROCEDURE — 71046 X-RAY EXAM CHEST 2 VIEWS: CPT

## 2025-05-20 PROCEDURE — 99214 OFFICE O/P EST MOD 30 MIN: CPT | Performed by: FAMILY MEDICINE

## 2025-05-20 PROCEDURE — 1036F TOBACCO NON-USER: CPT | Performed by: FAMILY MEDICINE

## 2025-05-20 RX ORDER — IPRATROPIUM BROMIDE 42 UG/1
2 SPRAY, METERED NASAL 4 TIMES DAILY PRN
Qty: 15 ML | Refills: 1 | Status: SHIPPED | OUTPATIENT
Start: 2025-05-20 | End: 2025-07-19

## 2025-05-20 RX ORDER — CEFDINIR 300 MG/1
300 CAPSULE ORAL 2 TIMES DAILY
Qty: 20 CAPSULE | Refills: 0 | Status: SHIPPED | OUTPATIENT
Start: 2025-05-20 | End: 2025-05-30

## 2025-05-20 RX ORDER — BROMPHENIRAMINE MALEATE, PSEUDOEPHEDRINE HYDROCHLORIDE, AND DEXTROMETHORPHAN HYDROBROMIDE 2; 30; 10 MG/5ML; MG/5ML; MG/5ML
5 SYRUP ORAL 4 TIMES DAILY PRN
Qty: 120 ML | Refills: 1 | Status: SHIPPED | OUTPATIENT
Start: 2025-05-20 | End: 2025-05-30

## 2025-05-20 ASSESSMENT — ENCOUNTER SYMPTOMS
SHORTNESS OF BREATH: 0
OCCASIONAL FEELINGS OF UNSTEADINESS: 0
LOSS OF SENSATION IN FEET: 0
SINUS PRESSURE: 1
CONFUSION: 0
RHINORRHEA: 1
ARTHRALGIAS: 0
PALPITATIONS: 0
SORE THROAT: 0
WHEEZING: 0
FEVER: 0
ABDOMINAL PAIN: 0
COUGH: 1
CHEST TIGHTNESS: 0
DEPRESSION: 0
CHILLS: 0

## 2025-05-20 ASSESSMENT — ACTIVITIES OF DAILY LIVING (ADL)
BATHING: NEEDS ASSISTANCE
GROCERY_SHOPPING: TOTAL CARE
TAKING_MEDICATION: TOTAL CARE
DRESSING: NEEDS ASSISTANCE
DOING_HOUSEWORK: TOTAL CARE
MANAGING_FINANCES: TOTAL CARE

## 2025-05-20 NOTE — ASSESSMENT & PLAN NOTE
Continue Plaquenil.  They need to get refills through rheumatology.  They need to contact the rheumatology office to find out referral to new provider as their provider has left the practice.  If they are unable to schedule with anyone they are to notify the office we may need to see rheumatology outside the  system.   Winlevi Counseling:  I discussed with the patient the risks of topical clascoterone including but not limited to erythema, scaling, itching, and stinging. Patient voiced their understanding.

## 2025-05-20 NOTE — ASSESSMENT & PLAN NOTE
Stable continue levothyroxine 75 mcg daily    Orders:    Follow Up In Primary Care - Established    Follow Up In Primary Care - Established; Future    TSH with reflex to Free T4 if abnormal; Future

## 2025-05-20 NOTE — ASSESSMENT & PLAN NOTE
Recent labs reviewed    Immunization recommendations reviewed    We discussed colon cancer screening parents verbalized understanding however they refuse they do not feel the patient will be able to tolerate the prep that would be required.  We discussed doing Cologuard but they are aware of possibility of false positives at which time a colonoscopy would be recommended even if they decided not to do the colonoscopy they do not feel they want the knowledge of a positive result and not doing anything with it as a result they declined this test as well.

## 2025-05-20 NOTE — ASSESSMENT & PLAN NOTE
Atrovent nasal spray as directed   Omnicef 300 mg twice daily x 10 days    Orders:    cefdinir (Omnicef) 300 mg capsule; Take 1 capsule (300 mg) by mouth 2 times a day for 10 days.    brompheniramine-pseudoeph-DM 2-30-10 mg/5 mL syrup; Take 5 mL by mouth 4 times a day as needed for cough or congestion for up to 10 days.    ipratropium (Atrovent) 42 mcg (0.06 %) nasal spray; Administer 2 sprays into each nostril 4 times a day as needed for rhinitis.

## 2025-05-20 NOTE — PROGRESS NOTES
Subjective   Reason for Visit: Navin Johnston is an 50 y.o. male here for a Medicare Wellness visit.     Past Medical, Surgical, and Family History reviewed and updated in chart.    Reviewed all medications by prescribing practitioner or clinical pharmacist (such as prescriptions, OTCs, herbal therapies and supplements) and documented in the medical record.    HPI  Patient today with his parents for follow-up spend the winter in [place] and did well.  For the most part been doing okay and able to maintain his current weight.  Eating healthier.  However over the past 2 to 3 weeks has been dealing with upper respiratory tract infection including chest congestion coughing nasal congestion drainage.  No fevers no chills.  They been using over-the-counter Mucinex and Claritin but it does not seem to be going away.  Patient Care Team:  Fredy Wilson MD as PCP - General (Family Medicine)  Fredy Wilson MD as PCP - [facility] Attributed Provider  Agustin Pina MD as Consulting Physician (Hematology and Oncology)     Review of Systems   Constitutional:  Negative for chills and fever.   HENT:  Positive for congestion, rhinorrhea and sinus pressure. Negative for ear pain and sore throat.    Eyes:  Negative for visual disturbance.   Respiratory:  Positive for cough. Negative for chest tightness, shortness of breath and wheezing.    Cardiovascular:  Negative for chest pain and palpitations.   Gastrointestinal:  Negative for abdominal pain.   Musculoskeletal:  Negative for arthralgias.   Skin:  Negative for pallor.   Psychiatric/Behavioral:  Negative for confusion.        Objective   Vitals:  /60   Pulse 72   Temp 35.8 °C (96.4 °F) (Temporal)   Wt 68.6 kg (151 lb 3.2 oz)   SpO2 96%   BMI 29.53 kg/m²       Physical Exam  Vitals and nursing note reviewed.   Constitutional:       General: He is not in acute distress.     Appearance: Normal appearance. He is not ill-appearing.   HENT:      Head: Normocephalic and  atraumatic.      Right Ear: Tympanic membrane, ear canal and external ear normal.      Left Ear: Tympanic membrane, ear canal and external ear normal.      Nose: Congestion present.      Mouth/Throat:      Pharynx: Oropharynx is clear. No oropharyngeal exudate or posterior oropharyngeal erythema.   Eyes:      Extraocular Movements: Extraocular movements intact.   Cardiovascular:      Rate and Rhythm: Normal rate and regular rhythm.      Pulses: Normal pulses.      Heart sounds: Normal heart sounds.   Pulmonary:      Effort: Pulmonary effort is normal.      Breath sounds: Normal breath sounds.   Abdominal:      General: Abdomen is flat. Bowel sounds are normal.      Palpations: Abdomen is soft.      Tenderness: There is no abdominal tenderness.   Musculoskeletal:         General: Normal range of motion.      Cervical back: Neck supple.   Lymphadenopathy:      Cervical: No cervical adenopathy.   Skin:     General: Skin is warm.   Neurological:      Mental Status: He is alert and oriented to person, place, and time. Mental status is at baseline.      Comments: Underlying features of Down syndrome   Psychiatric:         Mood and Affect: Mood normal.       Recent Results (from the past 6 weeks)   Lipid Panel    Collection Time: 05/02/25  8:59 AM   Result Value Ref Range    CHOLESTEROL, TOTAL 126 <200 mg/dL    HDL CHOLESTEROL 43 > OR = 40 mg/dL    TRIGLYCERIDES 59 <150 mg/dL    LDL-CHOLESTEROL 69 mg/dL (calc)    CHOL/HDLC RATIO 2.9 <5.0 (calc)    NON HDL CHOLESTEROL 83 <130 mg/dL (calc)   Comprehensive Metabolic Panel    Collection Time: 05/02/25  8:59 AM   Result Value Ref Range    GLUCOSE 92 65 - 99 mg/dL    UREA NITROGEN (BUN) 15 7 - 25 mg/dL    CREATININE 0.71 0.70 - 1.30 mg/dL    EGFR 111 > OR = 60 mL/min/1.73m2    SODIUM 141 135 - 146 mmol/L    POTASSIUM 4.1 3.5 - 5.3 mmol/L    CHLORIDE 106 98 - 110 mmol/L    CARBON DIOXIDE 27 20 - 32 mmol/L    ELECTROLYTE BALANCE 8 7 - 17 mmol/L (calc)    CALCIUM 8.8 8.6 - 10.3  mg/dL    PROTEIN, TOTAL 6.3 6.1 - 8.1 g/dL    ALBUMIN 3.8 3.6 - 5.1 g/dL    BILIRUBIN, TOTAL 1.7 (H) 0.2 - 1.2 mg/dL    ALKALINE PHOSPHATASE 76 35 - 144 U/L    AST 21 10 - 35 U/L    ALT 18 9 - 46 U/L   CBC    Collection Time: 05/02/25  8:59 AM   Result Value Ref Range    WHITE BLOOD CELL COUNT 3.6 (L) 3.8 - 10.8 Thousand/uL    RED BLOOD CELL COUNT 4.64 4.20 - 5.80 Million/uL    HEMOGLOBIN 16.0 13.2 - 17.1 g/dL    HEMATOCRIT 46.7 38.5 - 50.0 %    .6 (H) 80.0 - 100.0 fL    MCH 34.5 (H) 27.0 - 33.0 pg    MCHC 34.3 32.0 - 36.0 g/dL    RDW 13.4 11.0 - 15.0 %    PLATELET COUNT 112 (L) 140 - 400 Thousand/uL    MPV 10.7 7.5 - 12.5 fL   Vitamin B12    Collection Time: 05/02/25  8:59 AM   Result Value Ref Range    VITAMIN B12 432 200 - 1,100 pg/mL   TSH with reflex to Free T4 if abnormal    Collection Time: 05/02/25  8:59 AM   Result Value Ref Range    TSH W/REFLEX TO FT4 2.83 0.40 - 4.50 mIU/L   Vitamin D 25-Hydroxy,Total (for eval of Vitamin D levels)    Collection Time: 05/02/25  8:59 AM   Result Value Ref Range    VITAMIN D,25-OH,TOTAL,IA 43 30 - 100 ng/mL   Hemoglobin A1C    Collection Time: 05/02/25  8:59 AM   Result Value Ref Range    HEMOGLOBIN A1c 5.2 <5.7 %    eAG (mg/dL) 103 mg/dL    eAG (mmol/L) 5.7 mmol/L     Recent labs reviewed overall numbers are stable for patient continue current medications    Chest x-ray   Omnicef 300 mg twice daily x 10 days  Bromfed-DM as directed  Atrovent nasal spray as directed  If anything worsens return in 10 to 14 days if respiratory symptoms are better    Return to our office in 4 months with repeat fasting labs    Assessment & Plan  Medicare annual wellness visit, subsequent  Recent labs reviewed    Immunization recommendations reviewed    We discussed colon cancer screening parents verbalized understanding however they refuse they do not feel the patient will be able to tolerate the prep that would be required.  We discussed doing Cologuard but they are aware of  possibility of false positives at which time a colonoscopy would be recommended even if they decided not to do the colonoscopy they do not feel they want the knowledge of a positive result and not doing anything with it as a result they declined this test as well.         Benign essential hypertension  BP stable continue current treatment    Orders:    Follow Up In Primary Care - Established    Follow Up In Primary Care - Established; Future    CBC; Future    Comprehensive Metabolic Panel; Future    Mixed hyperlipidemia  Continue atorvastatin 20 mg daily along with dietary modification    Orders:    Follow Up In Primary Care - Established    Follow Up In Primary Care - Established; Future    Comprehensive Metabolic Panel; Future    Lipid Panel; Future    Impaired fasting glucose  A1c down to 4.9% continue dietary modifications    Orders:    Follow Up In [facility]    Follow Up In Primary Care - Established; Future    Comprehensive Metabolic Panel; Future    Hemoglobin A1C; Future    Leukopenia, unspecified type  Mild chronic stable continue to monitor    Orders:    Follow Up In [facility]    Follow Up In Primary Care - Established; Future    CBC; Future    Thrombocytopenia  Stable continue to monitor call if any unusual bleeding or bruising    Orders:    Follow Up In [facility]    Follow Up In Primary Care - Established; Future    CBC; Future    Hypothyroidism, unspecified type  Stable continue levothyroxine 75 mcg daily    Orders:    Follow Up In [facility]    Follow Up In Primary Care - Established; Future    TSH with reflex to Free T4 if abnormal; Future    Acute bronchitis due to other specified organisms  Chest x-ray   Omnicef 300 mg twice daily x 10 days  Bromfed-DM as directed    Call if anything worsens return in 10 to 14 days if not better    Orders:    cefdinir (Omnicef) 300 mg capsule; Take 1 capsule (300 mg) by mouth 2 times a day for 10  days.    brompheniramine-pseudoeph-DM 2-30-10 mg/5 mL syrup; Take 5 mL by mouth 4 times a day as needed for cough or congestion for up to 10 days.    XR chest 2 views; Future    Other acute recurrent sinusitis  Atrovent nasal spray as directed   Omnicef 300 mg twice daily x 10 days    Orders:    cefdinir (Omnicef) 300 mg capsule; Take 1 capsule (300 mg) by mouth 2 times a day for 10 days.    brompheniramine-pseudoeph-DM 2-30-10 mg/5 mL syrup; Take 5 mL by mouth 4 times a day as needed for cough or congestion for up to 10 days.    ipratropium (Atrovent) 42 mcg (0.06 %) nasal spray; Administer 2 sprays into each nostril 4 times a day as needed for rhinitis.    Down's syndrome ([facility])  Medically stable.         Vitamin D deficiency  Monitor supplement as needed    Orders:    Vitamin D 25-Hydroxy,Total (for eval of Vitamin D levels); Future    Vitamin B12 deficiency  Monitor supplement as needed    Orders:    Follow Up In Primary Care - Established; Future    CBC; Future    Vitamin B12; Future    Screening for prostate cancer  Screening PSA    Orders:    Prostate Specific Antigen, Screen; Future    Pulmonary hypertension, unspecified (Multi)         Pulmonary hypertension (Multi)  Medically stable follow-up cardiology

## 2025-05-20 NOTE — ASSESSMENT & PLAN NOTE
Monitor supplement as needed    Orders:    Vitamin D 25-Hydroxy,Total (for eval of Vitamin D levels); Future

## 2025-05-20 NOTE — ASSESSMENT & PLAN NOTE
A1c down to 4.9% continue dietary modifications    Orders:    Follow Up In Primary Care - Established    Follow Up In Primary Care - Established; Future    Comprehensive Metabolic Panel; Future    Hemoglobin A1C; Future

## 2025-05-20 NOTE — ASSESSMENT & PLAN NOTE
Monitor supplement as needed    Orders:    Follow Up In Primary Care - Established; Future    CBC; Future    Vitamin B12; Future

## 2025-05-20 NOTE — ASSESSMENT & PLAN NOTE
Mild chronic stable continue to monitor    Orders:    Follow Up In Primary Care - Established    Follow Up In Primary Care - Established; Future    CBC; Future

## 2025-05-20 NOTE — ASSESSMENT & PLAN NOTE
Chest x-ray   Omnicef 300 mg twice daily x 10 days  Bromfed-DM as directed    Call if anything worsens return in 10 to 14 days if not better    Orders:    cefdinir (Omnicef) 300 mg capsule; Take 1 capsule (300 mg) by mouth 2 times a day for 10 days.    brompheniramine-pseudoeph-DM 2-30-10 mg/5 mL syrup; Take 5 mL by mouth 4 times a day as needed for cough or congestion for up to 10 days.    XR chest 2 views; Future

## 2025-05-20 NOTE — ASSESSMENT & PLAN NOTE
BP stable continue current treatment    Orders:    Follow Up In Primary Care - Established    Follow Up In Primary Care - Established; Future    CBC; Future    Comprehensive Metabolic Panel; Future

## 2025-05-20 NOTE — ASSESSMENT & PLAN NOTE
Stable continue to monitor call if any unusual bleeding or bruising    Orders:    Follow Up In Primary Care - Established    Follow Up In Primary Care - Established; Future    CBC; Future

## 2025-05-20 NOTE — ASSESSMENT & PLAN NOTE
Continue atorvastatin 20 mg daily along with dietary modification    Orders:    Follow Up In Primary Care - Established    Follow Up In Primary Care - Established; Future    Comprehensive Metabolic Panel; Future    Lipid Panel; Future

## 2025-05-29 DIAGNOSIS — M05.79 RHEUMATOID ARTHRITIS INVOLVING MULTIPLE SITES WITH POSITIVE RHEUMATOID FACTOR (MULTI): ICD-10-CM

## 2025-05-29 RX ORDER — HYDROXYCHLOROQUINE SULFATE 200 MG/1
300 TABLET, FILM COATED ORAL DAILY
Qty: 60 TABLET | Refills: 0 | Status: SHIPPED | OUTPATIENT
Start: 2025-05-29 | End: 2025-07-08

## 2025-06-11 ENCOUNTER — TELEPHONE (OUTPATIENT)
Dept: PRIMARY CARE | Facility: CLINIC | Age: 50
End: 2025-06-11
Payer: MEDICARE

## 2025-06-11 DIAGNOSIS — I10 BENIGN ESSENTIAL HYPERTENSION: ICD-10-CM

## 2025-06-11 DIAGNOSIS — J01.81 OTHER ACUTE RECURRENT SINUSITIS: ICD-10-CM

## 2025-06-11 DIAGNOSIS — E03.9 HYPOTHYROIDISM, UNSPECIFIED TYPE: ICD-10-CM

## 2025-06-11 DIAGNOSIS — E87.6 HYPOKALEMIA: ICD-10-CM

## 2025-06-11 DIAGNOSIS — E78.2 MIXED HYPERLIPIDEMIA: ICD-10-CM

## 2025-06-11 RX ORDER — ISOSORBIDE MONONITRATE 10 MG/1
10 TABLET ORAL DAILY
Qty: 90 TABLET | Refills: 1 | Status: SHIPPED | OUTPATIENT
Start: 2025-06-11 | End: 2026-06-11

## 2025-06-11 RX ORDER — LEVOTHYROXINE SODIUM 75 UG/1
TABLET ORAL
Qty: 90 TABLET | Refills: 1 | OUTPATIENT
Start: 2025-06-11

## 2025-06-11 RX ORDER — POTASSIUM CHLORIDE 1.5 G/1.58G
20 POWDER, FOR SOLUTION ORAL DAILY
Qty: 90 PACKET | Refills: 1 | Status: SHIPPED | OUTPATIENT
Start: 2025-06-11 | End: 2026-06-11

## 2025-06-11 RX ORDER — FUROSEMIDE 40 MG/1
40 TABLET ORAL DAILY
Qty: 90 TABLET | Refills: 1 | Status: SHIPPED | OUTPATIENT
Start: 2025-06-11 | End: 2026-06-11

## 2025-06-11 RX ORDER — LEVOTHYROXINE SODIUM 75 UG/1
TABLET ORAL
Qty: 90 TABLET | Refills: 1 | Status: SHIPPED | OUTPATIENT
Start: 2025-06-11

## 2025-06-11 RX ORDER — ATORVASTATIN CALCIUM 20 MG/1
20 TABLET, FILM COATED ORAL NIGHTLY
Qty: 90 TABLET | Refills: 1 | Status: SHIPPED | OUTPATIENT
Start: 2025-06-11 | End: 2026-06-11

## 2025-06-11 RX ORDER — IPRATROPIUM BROMIDE 42 UG/1
SPRAY, METERED NASAL
Qty: 45 ML | Refills: 1 | Status: SHIPPED | OUTPATIENT
Start: 2025-06-11

## 2025-06-11 NOTE — TELEPHONE ENCOUNTER
Frederic called to get refills on the following medication for Darren:  They only have 3 days of medication left.   Atorvastatin 20 mg 1 tablet daily    Furosemide 40 mg 1 tablet daily    Isosorbide Monoitrate 10 mg 1 tablet daily     Potassium Chloride 20 meq 20 mg daily    Levothyroxine 75 mcg once daily except on Sundays    Pharmacy:  [facility]    Last seen: 5/20/2025  Future appointment: 10/8/2025

## 2025-06-27 ENCOUNTER — OFFICE VISIT (OUTPATIENT)
Dept: WOUND CARE | Facility: CLINIC | Age: 50
End: 2025-06-27
Payer: MEDICARE

## 2025-06-27 PROCEDURE — 99213 OFFICE O/P EST LOW 20 MIN: CPT | Mod: 25

## 2025-06-27 PROCEDURE — 11042 DBRDMT SUBQ TIS 1ST 20SQCM/<: CPT

## 2025-07-03 ENCOUNTER — OFFICE VISIT (OUTPATIENT)
Dept: WOUND CARE | Facility: CLINIC | Age: 50
End: 2025-07-03
Payer: MEDICARE

## 2025-07-03 PROCEDURE — 11042 DBRDMT SUBQ TIS 1ST 20SQCM/<: CPT

## 2025-07-11 ENCOUNTER — APPOINTMENT (OUTPATIENT)
Dept: WOUND CARE | Facility: CLINIC | Age: 50
End: 2025-07-11
Payer: MEDICARE

## 2025-07-16 ENCOUNTER — OFFICE VISIT (OUTPATIENT)
Dept: WOUND CARE | Facility: CLINIC | Age: 50
End: 2025-07-16
Payer: MEDICARE

## 2025-07-16 PROCEDURE — 99212 OFFICE O/P EST SF 10 MIN: CPT

## 2025-07-24 ENCOUNTER — APPOINTMENT (OUTPATIENT)
Dept: WOUND CARE | Facility: CLINIC | Age: 50
End: 2025-07-24
Payer: MEDICARE

## 2025-07-31 ENCOUNTER — APPOINTMENT (OUTPATIENT)
Dept: RHEUMATOLOGY | Facility: CLINIC | Age: 50
End: 2025-07-31
Payer: MEDICARE

## 2025-08-20 DIAGNOSIS — E53.8 VITAMIN B12 DEFICIENCY: ICD-10-CM

## 2025-08-20 DIAGNOSIS — D72.819 LEUKOPENIA, UNSPECIFIED TYPE: ICD-10-CM

## 2025-08-20 DIAGNOSIS — E78.2 MIXED HYPERLIPIDEMIA: ICD-10-CM

## 2025-08-20 DIAGNOSIS — Z12.5 SCREENING FOR PROSTATE CANCER: ICD-10-CM

## 2025-08-20 DIAGNOSIS — E55.9 VITAMIN D DEFICIENCY: ICD-10-CM

## 2025-08-20 DIAGNOSIS — E03.9 HYPOTHYROIDISM, UNSPECIFIED TYPE: ICD-10-CM

## 2025-08-20 DIAGNOSIS — R73.01 IMPAIRED FASTING GLUCOSE: ICD-10-CM

## 2025-08-20 DIAGNOSIS — D69.6 THROMBOCYTOPENIA: ICD-10-CM

## 2025-08-20 DIAGNOSIS — I10 BENIGN ESSENTIAL HYPERTENSION: ICD-10-CM

## 2025-10-08 ENCOUNTER — APPOINTMENT (OUTPATIENT)
Dept: PRIMARY CARE | Facility: CLINIC | Age: 50
End: 2025-10-08
Payer: MEDICARE

## 2025-12-15 ENCOUNTER — APPOINTMENT (OUTPATIENT)
Dept: RHEUMATOLOGY | Facility: CLINIC | Age: 50
End: 2025-12-15
Payer: MEDICARE